# Patient Record
Sex: MALE | Race: WHITE | Employment: OTHER | URBAN - METROPOLITAN AREA
[De-identification: names, ages, dates, MRNs, and addresses within clinical notes are randomized per-mention and may not be internally consistent; named-entity substitution may affect disease eponyms.]

---

## 2017-01-18 ENCOUNTER — TELEPHONE (OUTPATIENT)
Dept: ORTHOPEDIC SURGERY | Age: 67
End: 2017-01-18

## 2017-01-19 ENCOUNTER — OFFICE VISIT (OUTPATIENT)
Dept: INTERNAL MEDICINE CLINIC | Age: 67
End: 2017-01-19

## 2017-01-19 VITALS
WEIGHT: 231 LBS | HEART RATE: 60 BPM | DIASTOLIC BLOOD PRESSURE: 90 MMHG | BODY MASS INDEX: 28.13 KG/M2 | HEIGHT: 76 IN | SYSTOLIC BLOOD PRESSURE: 138 MMHG

## 2017-01-19 DIAGNOSIS — I10 ESSENTIAL HYPERTENSION: ICD-10-CM

## 2017-01-19 DIAGNOSIS — M75.112 INCOMPLETE TEAR OF LEFT ROTATOR CUFF: Primary | ICD-10-CM

## 2017-01-19 DIAGNOSIS — Z01.818 PRE-OPERATIVE GENERAL PHYSICAL EXAMINATION: ICD-10-CM

## 2017-01-19 PROCEDURE — G8420 CALC BMI NORM PARAMETERS: HCPCS | Performed by: INTERNAL MEDICINE

## 2017-01-19 PROCEDURE — 99214 OFFICE O/P EST MOD 30 MIN: CPT | Performed by: INTERNAL MEDICINE

## 2017-01-19 PROCEDURE — 93010 ELECTROCARDIOGRAM REPORT: CPT | Performed by: INTERNAL MEDICINE

## 2017-01-19 PROCEDURE — 3017F COLORECTAL CA SCREEN DOC REV: CPT | Performed by: INTERNAL MEDICINE

## 2017-01-19 PROCEDURE — G8427 DOCREV CUR MEDS BY ELIG CLIN: HCPCS | Performed by: INTERNAL MEDICINE

## 2017-01-19 PROCEDURE — G8484 FLU IMMUNIZE NO ADMIN: HCPCS | Performed by: INTERNAL MEDICINE

## 2017-01-19 PROCEDURE — 1123F ACP DISCUSS/DSCN MKR DOCD: CPT | Performed by: INTERNAL MEDICINE

## 2017-01-19 PROCEDURE — 4040F PNEUMOC VAC/ADMIN/RCVD: CPT | Performed by: INTERNAL MEDICINE

## 2017-01-19 PROCEDURE — 1036F TOBACCO NON-USER: CPT | Performed by: INTERNAL MEDICINE

## 2017-01-19 PROCEDURE — 93005 ELECTROCARDIOGRAM TRACING: CPT | Performed by: INTERNAL MEDICINE

## 2017-01-20 LAB
ANION GAP SERPL CALCULATED.3IONS-SCNC: 12 MMOL/L (ref 3–16)
BASOPHILS ABSOLUTE: 0 K/UL (ref 0–0.2)
BASOPHILS RELATIVE PERCENT: 0.9 %
BUN BLDV-MCNC: 18 MG/DL (ref 7–20)
CALCIUM SERPL-MCNC: 9.7 MG/DL (ref 8.3–10.6)
CHLORIDE BLD-SCNC: 98 MMOL/L (ref 99–110)
CO2: 30 MMOL/L (ref 21–32)
CREAT SERPL-MCNC: 1.2 MG/DL (ref 0.8–1.3)
EOSINOPHILS ABSOLUTE: 0.2 K/UL (ref 0–0.6)
EOSINOPHILS RELATIVE PERCENT: 4.4 %
GFR AFRICAN AMERICAN: >60
GFR NON-AFRICAN AMERICAN: >60
GLUCOSE BLD-MCNC: 87 MG/DL (ref 70–99)
HCT VFR BLD CALC: 45.2 % (ref 40.5–52.5)
HEMOGLOBIN: 15.3 G/DL (ref 13.5–17.5)
LYMPHOCYTES ABSOLUTE: 2.3 K/UL (ref 1–5.1)
LYMPHOCYTES RELATIVE PERCENT: 46.4 %
MCH RBC QN AUTO: 31.8 PG (ref 26–34)
MCHC RBC AUTO-ENTMCNC: 33.8 G/DL (ref 31–36)
MCV RBC AUTO: 94 FL (ref 80–100)
MONOCYTES ABSOLUTE: 0.6 K/UL (ref 0–1.3)
MONOCYTES RELATIVE PERCENT: 11.1 %
NEUTROPHILS ABSOLUTE: 1.8 K/UL (ref 1.7–7.7)
NEUTROPHILS RELATIVE PERCENT: 37.2 %
PDW BLD-RTO: 13.3 % (ref 12.4–15.4)
PLATELET # BLD: 170 K/UL (ref 135–450)
PMV BLD AUTO: 8.3 FL (ref 5–10.5)
POTASSIUM SERPL-SCNC: 4.1 MMOL/L (ref 3.5–5.1)
RBC # BLD: 4.81 M/UL (ref 4.2–5.9)
SODIUM BLD-SCNC: 140 MMOL/L (ref 136–145)
WBC # BLD: 5 K/UL (ref 4–11)

## 2017-01-24 ENCOUNTER — HOSPITAL ENCOUNTER (OUTPATIENT)
Dept: SURGERY | Age: 67
Discharge: HOME | End: 2017-01-24
Attending: ORTHOPAEDIC SURGERY | Admitting: ORTHOPAEDIC SURGERY

## 2017-01-24 VITALS
HEART RATE: 76 BPM | BODY MASS INDEX: 28.13 KG/M2 | WEIGHT: 231 LBS | OXYGEN SATURATION: 92 % | SYSTOLIC BLOOD PRESSURE: 129 MMHG | TEMPERATURE: 97.2 F | HEIGHT: 76 IN | RESPIRATION RATE: 18 BRPM | DIASTOLIC BLOOD PRESSURE: 75 MMHG

## 2017-01-24 DIAGNOSIS — M75.122 COMPLETE ROTATOR CUFF TEAR OR RUPTURE OF LEFT SHOULDER, NOT SPECIFIED AS TRAUMATIC: Primary | ICD-10-CM

## 2017-01-24 RX ORDER — LIDOCAINE HYDROCHLORIDE 10 MG/ML
1 INJECTION, SOLUTION EPIDURAL; INFILTRATION; INTRACAUDAL; PERINEURAL
Status: COMPLETED | OUTPATIENT
Start: 2017-01-24 | End: 2017-01-24

## 2017-01-24 RX ORDER — ONDANSETRON 2 MG/ML
4 INJECTION INTRAMUSCULAR; INTRAVENOUS
Status: ACTIVE | OUTPATIENT
Start: 2017-01-24 | End: 2017-01-24

## 2017-01-24 RX ORDER — MORPHINE SULFATE 2 MG/ML
1 INJECTION, SOLUTION INTRAMUSCULAR; INTRAVENOUS EVERY 5 MIN PRN
Status: DISCONTINUED | OUTPATIENT
Start: 2017-01-24 | End: 2017-01-25 | Stop reason: HOSPADM

## 2017-01-24 RX ORDER — SODIUM CHLORIDE, SODIUM LACTATE, POTASSIUM CHLORIDE, CALCIUM CHLORIDE 600; 310; 30; 20 MG/100ML; MG/100ML; MG/100ML; MG/100ML
INJECTION, SOLUTION INTRAVENOUS CONTINUOUS
Status: DISCONTINUED | OUTPATIENT
Start: 2017-01-24 | End: 2017-01-25 | Stop reason: HOSPADM

## 2017-01-24 RX ORDER — LABETALOL HYDROCHLORIDE 5 MG/ML
5 INJECTION, SOLUTION INTRAVENOUS EVERY 10 MIN PRN
Status: DISCONTINUED | OUTPATIENT
Start: 2017-01-24 | End: 2017-01-25 | Stop reason: HOSPADM

## 2017-01-24 RX ORDER — SODIUM CHLORIDE 0.9 % (FLUSH) 0.9 %
10 SYRINGE (ML) INJECTION EVERY 12 HOURS SCHEDULED
Status: DISCONTINUED | OUTPATIENT
Start: 2017-01-24 | End: 2017-01-25 | Stop reason: HOSPADM

## 2017-01-24 RX ORDER — MORPHINE SULFATE 10 MG/ML
2 INJECTION, SOLUTION INTRAMUSCULAR; INTRAVENOUS EVERY 5 MIN PRN
Status: DISCONTINUED | OUTPATIENT
Start: 2017-01-24 | End: 2017-01-25 | Stop reason: HOSPADM

## 2017-01-24 RX ORDER — ACETAMINOPHEN 10 MG/ML
1000 INJECTION, SOLUTION INTRAVENOUS ONCE
Status: COMPLETED | OUTPATIENT
Start: 2017-01-24 | End: 2017-01-24

## 2017-01-24 RX ORDER — DIPHENHYDRAMINE HYDROCHLORIDE 50 MG/ML
12.5 INJECTION INTRAMUSCULAR; INTRAVENOUS
Status: ACTIVE | OUTPATIENT
Start: 2017-01-24 | End: 2017-01-24

## 2017-01-24 RX ORDER — MEPERIDINE HYDROCHLORIDE 50 MG/ML
12.5 INJECTION INTRAMUSCULAR; INTRAVENOUS; SUBCUTANEOUS EVERY 5 MIN PRN
Status: DISCONTINUED | OUTPATIENT
Start: 2017-01-24 | End: 2017-01-25 | Stop reason: HOSPADM

## 2017-01-24 RX ORDER — OXYCODONE HYDROCHLORIDE AND ACETAMINOPHEN 5; 325 MG/1; MG/1
2 TABLET ORAL PRN
Status: ACTIVE | OUTPATIENT
Start: 2017-01-24 | End: 2017-01-24

## 2017-01-24 RX ORDER — HYDRALAZINE HYDROCHLORIDE 20 MG/ML
5 INJECTION INTRAMUSCULAR; INTRAVENOUS
Status: DISCONTINUED | OUTPATIENT
Start: 2017-01-24 | End: 2017-01-25 | Stop reason: HOSPADM

## 2017-01-24 RX ORDER — OXYCODONE HYDROCHLORIDE AND ACETAMINOPHEN 5; 325 MG/1; MG/1
1 TABLET ORAL PRN
Status: ACTIVE | OUTPATIENT
Start: 2017-01-24 | End: 2017-01-24

## 2017-01-24 RX ORDER — SODIUM CHLORIDE 0.9 % (FLUSH) 0.9 %
10 SYRINGE (ML) INJECTION PRN
Status: DISCONTINUED | OUTPATIENT
Start: 2017-01-24 | End: 2017-01-25 | Stop reason: HOSPADM

## 2017-01-24 RX ORDER — MIDAZOLAM HYDROCHLORIDE 1 MG/ML
INJECTION INTRAMUSCULAR; INTRAVENOUS
Status: DISPENSED
Start: 2017-01-24 | End: 2017-01-24

## 2017-01-24 RX ORDER — OXYCODONE HYDROCHLORIDE AND ACETAMINOPHEN 5; 325 MG/1; MG/1
1-2 TABLET ORAL EVERY 6 HOURS PRN
Qty: 50 TABLET | Refills: 0 | Status: SHIPPED | OUTPATIENT
Start: 2017-01-24 | End: 2018-04-11

## 2017-01-24 RX ADMIN — LIDOCAINE HYDROCHLORIDE 0.1 ML: 10 INJECTION, SOLUTION EPIDURAL; INFILTRATION; INTRACAUDAL; PERINEURAL at 06:37

## 2017-01-24 RX ADMIN — SODIUM CHLORIDE, SODIUM LACTATE, POTASSIUM CHLORIDE, CALCIUM CHLORIDE: 600; 310; 30; 20 INJECTION, SOLUTION INTRAVENOUS at 06:38

## 2017-01-24 RX ADMIN — ACETAMINOPHEN 1000 MG: 10 INJECTION, SOLUTION INTRAVENOUS at 06:35

## 2017-01-24 ASSESSMENT — PAIN - FUNCTIONAL ASSESSMENT: PAIN_FUNCTIONAL_ASSESSMENT: 0-10

## 2017-01-30 ENCOUNTER — HOSPITAL ENCOUNTER (OUTPATIENT)
Dept: PHYSICAL THERAPY | Age: 67
Discharge: HOME | End: 2017-01-31
Admitting: ORTHOPAEDIC SURGERY

## 2017-02-06 ENCOUNTER — OFFICE VISIT (OUTPATIENT)
Dept: ORTHOPEDIC SURGERY | Age: 67
End: 2017-02-06

## 2017-02-06 VITALS
HEIGHT: 75 IN | HEART RATE: 81 BPM | SYSTOLIC BLOOD PRESSURE: 130 MMHG | WEIGHT: 231 LBS | BODY MASS INDEX: 28.72 KG/M2 | DIASTOLIC BLOOD PRESSURE: 76 MMHG

## 2017-02-06 DIAGNOSIS — M75.122 COMPLETE ROTATOR CUFF TEAR OR RUPTURE OF LEFT SHOULDER, NOT SPECIFIED AS TRAUMATIC: ICD-10-CM

## 2017-02-06 DIAGNOSIS — M25.512 LEFT SHOULDER PAIN, UNSPECIFIED CHRONICITY: Primary | ICD-10-CM

## 2017-02-06 PROCEDURE — 73030 X-RAY EXAM OF SHOULDER: CPT | Performed by: ORTHOPAEDIC SURGERY

## 2017-02-06 PROCEDURE — 99024 POSTOP FOLLOW-UP VISIT: CPT | Performed by: ORTHOPAEDIC SURGERY

## 2017-03-13 ENCOUNTER — OFFICE VISIT (OUTPATIENT)
Dept: ORTHOPEDIC SURGERY | Age: 67
End: 2017-03-13

## 2017-03-13 VITALS
WEIGHT: 231 LBS | SYSTOLIC BLOOD PRESSURE: 130 MMHG | BODY MASS INDEX: 29.65 KG/M2 | DIASTOLIC BLOOD PRESSURE: 77 MMHG | HEART RATE: 84 BPM | HEIGHT: 74 IN

## 2017-03-13 DIAGNOSIS — M75.122 COMPLETE ROTATOR CUFF TEAR OR RUPTURE OF LEFT SHOULDER, NOT SPECIFIED AS TRAUMATIC: Primary | ICD-10-CM

## 2017-03-13 PROCEDURE — 99024 POSTOP FOLLOW-UP VISIT: CPT | Performed by: ORTHOPAEDIC SURGERY

## 2017-03-29 ENCOUNTER — OFFICE VISIT (OUTPATIENT)
Dept: INTERNAL MEDICINE CLINIC | Age: 67
End: 2017-03-29

## 2017-03-29 VITALS
SYSTOLIC BLOOD PRESSURE: 134 MMHG | WEIGHT: 231 LBS | HEIGHT: 76 IN | DIASTOLIC BLOOD PRESSURE: 78 MMHG | BODY MASS INDEX: 28.13 KG/M2 | HEART RATE: 72 BPM

## 2017-03-29 DIAGNOSIS — I10 ESSENTIAL HYPERTENSION: Primary | ICD-10-CM

## 2017-03-29 DIAGNOSIS — Z23 NEED FOR 23-POLYVALENT PNEUMOCOCCAL POLYSACCHARIDE VACCINE: ICD-10-CM

## 2017-03-29 PROCEDURE — 99213 OFFICE O/P EST LOW 20 MIN: CPT | Performed by: INTERNAL MEDICINE

## 2017-03-29 PROCEDURE — G8420 CALC BMI NORM PARAMETERS: HCPCS | Performed by: INTERNAL MEDICINE

## 2017-03-29 PROCEDURE — 1036F TOBACCO NON-USER: CPT | Performed by: INTERNAL MEDICINE

## 2017-03-29 PROCEDURE — G0009 ADMIN PNEUMOCOCCAL VACCINE: HCPCS | Performed by: INTERNAL MEDICINE

## 2017-03-29 PROCEDURE — G8427 DOCREV CUR MEDS BY ELIG CLIN: HCPCS | Performed by: INTERNAL MEDICINE

## 2017-03-29 PROCEDURE — G8484 FLU IMMUNIZE NO ADMIN: HCPCS | Performed by: INTERNAL MEDICINE

## 2017-03-29 PROCEDURE — 1123F ACP DISCUSS/DSCN MKR DOCD: CPT | Performed by: INTERNAL MEDICINE

## 2017-03-29 PROCEDURE — 3017F COLORECTAL CA SCREEN DOC REV: CPT | Performed by: INTERNAL MEDICINE

## 2017-03-29 PROCEDURE — 90732 PPSV23 VACC 2 YRS+ SUBQ/IM: CPT | Performed by: INTERNAL MEDICINE

## 2017-03-29 PROCEDURE — 4040F PNEUMOC VAC/ADMIN/RCVD: CPT | Performed by: INTERNAL MEDICINE

## 2017-03-29 RX ORDER — LISINOPRIL AND HYDROCHLOROTHIAZIDE 25; 20 MG/1; MG/1
TABLET ORAL
Qty: 90 TABLET | Refills: 1 | Status: SHIPPED | OUTPATIENT
Start: 2017-03-29 | End: 2017-10-27 | Stop reason: SDUPTHER

## 2017-03-29 ASSESSMENT — PATIENT HEALTH QUESTIONNAIRE - PHQ9
SUM OF ALL RESPONSES TO PHQ9 QUESTIONS 1 & 2: 0
SUM OF ALL RESPONSES TO PHQ QUESTIONS 1-9: 0
1. LITTLE INTEREST OR PLEASURE IN DOING THINGS: 0
2. FEELING DOWN, DEPRESSED OR HOPELESS: 0

## 2017-03-29 ASSESSMENT — ENCOUNTER SYMPTOMS
SHORTNESS OF BREATH: 0
DIARRHEA: 0
BLOOD IN STOOL: 0
WHEEZING: 0
VOMITING: 0
CHEST TIGHTNESS: 0
COUGH: 0
ABDOMINAL PAIN: 0
NAUSEA: 0

## 2017-04-03 ENCOUNTER — HOSPITAL ENCOUNTER (OUTPATIENT)
Dept: PHYSICAL THERAPY | Age: 67
Discharge: HOME | End: 2017-04-03
Admitting: ORTHOPAEDIC SURGERY

## 2017-04-10 ENCOUNTER — OFFICE VISIT (OUTPATIENT)
Dept: ORTHOPEDIC SURGERY | Age: 67
End: 2017-04-10

## 2017-04-10 VITALS — WEIGHT: 231 LBS | BODY MASS INDEX: 30.62 KG/M2 | HEIGHT: 73 IN

## 2017-04-10 DIAGNOSIS — M75.122 COMPLETE ROTATOR CUFF TEAR OR RUPTURE OF LEFT SHOULDER, NOT SPECIFIED AS TRAUMATIC: Primary | ICD-10-CM

## 2017-04-10 PROCEDURE — 99024 POSTOP FOLLOW-UP VISIT: CPT | Performed by: ORTHOPAEDIC SURGERY

## 2017-07-31 ENCOUNTER — OFFICE VISIT (OUTPATIENT)
Dept: INTERNAL MEDICINE CLINIC | Age: 67
End: 2017-07-31

## 2017-07-31 VITALS
HEIGHT: 76 IN | SYSTOLIC BLOOD PRESSURE: 132 MMHG | TEMPERATURE: 97.7 F | DIASTOLIC BLOOD PRESSURE: 80 MMHG | WEIGHT: 223 LBS | HEART RATE: 68 BPM | BODY MASS INDEX: 27.16 KG/M2

## 2017-07-31 DIAGNOSIS — J01.10 ACUTE NON-RECURRENT FRONTAL SINUSITIS: Primary | ICD-10-CM

## 2017-07-31 PROCEDURE — G8427 DOCREV CUR MEDS BY ELIG CLIN: HCPCS | Performed by: INTERNAL MEDICINE

## 2017-07-31 PROCEDURE — 1036F TOBACCO NON-USER: CPT | Performed by: INTERNAL MEDICINE

## 2017-07-31 PROCEDURE — 4040F PNEUMOC VAC/ADMIN/RCVD: CPT | Performed by: INTERNAL MEDICINE

## 2017-07-31 PROCEDURE — 3017F COLORECTAL CA SCREEN DOC REV: CPT | Performed by: INTERNAL MEDICINE

## 2017-07-31 PROCEDURE — 1123F ACP DISCUSS/DSCN MKR DOCD: CPT | Performed by: INTERNAL MEDICINE

## 2017-07-31 PROCEDURE — 99213 OFFICE O/P EST LOW 20 MIN: CPT | Performed by: INTERNAL MEDICINE

## 2017-07-31 PROCEDURE — G8417 CALC BMI ABV UP PARAM F/U: HCPCS | Performed by: INTERNAL MEDICINE

## 2017-07-31 RX ORDER — CEFDINIR 300 MG/1
300 CAPSULE ORAL 2 TIMES DAILY
Qty: 20 CAPSULE | Refills: 0 | Status: SHIPPED | OUTPATIENT
Start: 2017-07-31 | End: 2017-08-10

## 2017-07-31 ASSESSMENT — ENCOUNTER SYMPTOMS
SORE THROAT: 0
SINUS PAIN: 1
RHINORRHEA: 1
COUGH: 0
NAUSEA: 0
DIARRHEA: 0
SINUS PRESSURE: 1
WHEEZING: 0

## 2017-10-27 ENCOUNTER — OFFICE VISIT (OUTPATIENT)
Dept: INTERNAL MEDICINE CLINIC | Age: 67
End: 2017-10-27

## 2017-10-27 VITALS
BODY MASS INDEX: 26.91 KG/M2 | DIASTOLIC BLOOD PRESSURE: 80 MMHG | WEIGHT: 221 LBS | HEART RATE: 72 BPM | HEIGHT: 76 IN | SYSTOLIC BLOOD PRESSURE: 122 MMHG

## 2017-10-27 DIAGNOSIS — Z23 NEED FOR INFLUENZA VACCINATION: ICD-10-CM

## 2017-10-27 DIAGNOSIS — F17.219 NICOTINE DEPENDENCE, CIGARETTES, WITH UNSPECIFIED NICOTINE-INDUCED DISORDERS: ICD-10-CM

## 2017-10-27 DIAGNOSIS — I10 ESSENTIAL HYPERTENSION: Primary | ICD-10-CM

## 2017-10-27 DIAGNOSIS — Z13.6 SCREENING FOR AAA (ABDOMINAL AORTIC ANEURYSM): ICD-10-CM

## 2017-10-27 PROCEDURE — G8427 DOCREV CUR MEDS BY ELIG CLIN: HCPCS | Performed by: INTERNAL MEDICINE

## 2017-10-27 PROCEDURE — 4040F PNEUMOC VAC/ADMIN/RCVD: CPT | Performed by: INTERNAL MEDICINE

## 2017-10-27 PROCEDURE — 3017F COLORECTAL CA SCREEN DOC REV: CPT | Performed by: INTERNAL MEDICINE

## 2017-10-27 PROCEDURE — 90662 IIV NO PRSV INCREASED AG IM: CPT | Performed by: INTERNAL MEDICINE

## 2017-10-27 PROCEDURE — 1036F TOBACCO NON-USER: CPT | Performed by: INTERNAL MEDICINE

## 2017-10-27 PROCEDURE — G8484 FLU IMMUNIZE NO ADMIN: HCPCS | Performed by: INTERNAL MEDICINE

## 2017-10-27 PROCEDURE — 99213 OFFICE O/P EST LOW 20 MIN: CPT | Performed by: INTERNAL MEDICINE

## 2017-10-27 PROCEDURE — G8417 CALC BMI ABV UP PARAM F/U: HCPCS | Performed by: INTERNAL MEDICINE

## 2017-10-27 PROCEDURE — G0296 VISIT TO DETERM LDCT ELIG: HCPCS | Performed by: INTERNAL MEDICINE

## 2017-10-27 PROCEDURE — G0008 ADMIN INFLUENZA VIRUS VAC: HCPCS | Performed by: INTERNAL MEDICINE

## 2017-10-27 PROCEDURE — 1123F ACP DISCUSS/DSCN MKR DOCD: CPT | Performed by: INTERNAL MEDICINE

## 2017-10-27 RX ORDER — LISINOPRIL AND HYDROCHLOROTHIAZIDE 25; 20 MG/1; MG/1
TABLET ORAL
Qty: 90 TABLET | Refills: 1 | Status: SHIPPED | OUTPATIENT
Start: 2017-10-27 | End: 2018-04-11 | Stop reason: SDUPTHER

## 2017-10-27 ASSESSMENT — ENCOUNTER SYMPTOMS
DIARRHEA: 0
BLOOD IN STOOL: 0
CHEST TIGHTNESS: 0
SHORTNESS OF BREATH: 0
WHEEZING: 0
NAUSEA: 0
COUGH: 0
ABDOMINAL PAIN: 0
VOMITING: 0

## 2017-10-27 NOTE — PROGRESS NOTES
Subjective:      Patient ID: Ariana Tse is a 75 y.o. male. HPI    Is here for follow up of HTN and mild CKD  Patient's BP is controlled on current medications. Retired a couple of years ago, but works occasionally. No complaints. Review of Systems   Constitutional: Negative. Negative for fatigue and fever. Respiratory: Negative for cough, chest tightness, shortness of breath and wheezing. Cardiovascular: Negative for chest pain and palpitations. Gastrointestinal: Negative for abdominal pain, blood in stool, diarrhea, nausea and vomiting. Objective:   Physical Exam   Constitutional: He is oriented to person, place, and time. He appears well-developed and well-nourished. HENT:   Head: Normocephalic and atraumatic. Eyes: Pupils are equal, round, and reactive to light. Neck: Normal range of motion. Neck supple. No thyromegaly present. Cardiovascular: Normal rate, regular rhythm and normal heart sounds. Exam reveals no gallop and no friction rub. No murmur heard. No carotid bruit   Pulmonary/Chest: Effort normal and breath sounds normal. No respiratory distress. He has no wheezes. He has no rales. He exhibits no tenderness. Abdominal: Soft. Bowel sounds are normal. He exhibits no distension and no mass. There is no tenderness. There is no rebound and no guarding. Musculoskeletal: He exhibits no edema. Neurological: He is alert and oriented to person, place, and time. Assessment:      1. Essential hypertension     2. Need for influenza vaccination  INFLUENZA, HIGH DOSE, 65 YRS +, IM, PF, PREFILL SYR, 0.5ML (FLUZONE HD)   3. Screening for AAA (abdominal aortic aneurysm)  US Abdominal Aorta   4. Nicotine dependence, cigarettes, with unspecified nicotine-induced disorders  AR VISIT TO DISCUSS LUNG CA SCREEN W LDCT    CT Lung Screening           Plan:        Blood pressure is good. Continue current medications.      Up to date on colonoscopy.           Low Dose CT (LDCT) Lung Screening criteria met   Age 50-69   Pack year smoking >30   Still smoking or less than 15 year since quit   No sign or symptoms of lung cancer   > 11 months since last LDCT     Risks and benefits of lung cancer screening with LDCT scans discussed:    Significance of positive screen - False-positive LDCT results often occur. 95% of all positive results do not lead to a diagnosis of cancer. Usually further imaging can resolve most false-positive results; however, some patients may require invasive procedures. Over diagnosis risk - 10% to 12% of screen-detected lung cancer cases are over diagnosedthat is, the cancer would not have been detected in the patient's lifetime without the screening. Need for follow up screens annually to continue lung cancer screening effectiveness     Risks associated with radiation from annual LDCT- Radiation exposure is about the same as for a mammogram, which is about 1/3 of the annual background radiation exposure from everyday life. Starting screening at age 54 is not likely to increase cancer risk from radiation exposure. Patients with comorbidities resulting in life expectancy of < 10 years, or that would preclude treatment of an abnormality identified on CT, should not be screened due to lack of benefit.     To obtain maximal benefit from this screening, smoking cessation and long-term abstinence from smoking is critical

## 2017-11-14 ENCOUNTER — TELEPHONE (OUTPATIENT)
Dept: CASE MANAGEMENT | Age: 67
End: 2017-11-14

## 2017-11-14 ENCOUNTER — HOSPITAL ENCOUNTER (OUTPATIENT)
Dept: CT IMAGING | Age: 67
Discharge: HOME | End: 2017-11-14
Admitting: INTERNAL MEDICINE

## 2017-11-14 DIAGNOSIS — Z13.6 SCREENING FOR AAA (ABDOMINAL AORTIC ANEURYSM): ICD-10-CM

## 2017-11-14 DIAGNOSIS — Z13.6 ENCOUNTER FOR SCREENING FOR CARDIOVASCULAR DISORDERS: ICD-10-CM

## 2017-11-14 DIAGNOSIS — F17.219 NICOTINE DEPENDENCE, CIGARETTES, WITH UNSPECIFIED NICOTINE-INDUCED DISORDERS: ICD-10-CM

## 2017-11-14 NOTE — TELEPHONE ENCOUNTER
Baseline lung screen on 11-14-17. LRAD1. Recommended screen in one year. Reviewed by ordering physician and ordering office has contacted pt with results.

## 2018-04-11 ENCOUNTER — OFFICE VISIT (OUTPATIENT)
Dept: INTERNAL MEDICINE CLINIC | Age: 68
End: 2018-04-11

## 2018-04-11 VITALS
WEIGHT: 219 LBS | HEART RATE: 76 BPM | BODY MASS INDEX: 26.67 KG/M2 | HEIGHT: 76 IN | SYSTOLIC BLOOD PRESSURE: 136 MMHG | DIASTOLIC BLOOD PRESSURE: 84 MMHG

## 2018-04-11 DIAGNOSIS — I10 ESSENTIAL HYPERTENSION: Primary | ICD-10-CM

## 2018-04-11 PROCEDURE — G8428 CUR MEDS NOT DOCUMENT: HCPCS | Performed by: INTERNAL MEDICINE

## 2018-04-11 PROCEDURE — 99213 OFFICE O/P EST LOW 20 MIN: CPT | Performed by: INTERNAL MEDICINE

## 2018-04-11 PROCEDURE — 3017F COLORECTAL CA SCREEN DOC REV: CPT | Performed by: INTERNAL MEDICINE

## 2018-04-11 PROCEDURE — 1123F ACP DISCUSS/DSCN MKR DOCD: CPT | Performed by: INTERNAL MEDICINE

## 2018-04-11 PROCEDURE — G8417 CALC BMI ABV UP PARAM F/U: HCPCS | Performed by: INTERNAL MEDICINE

## 2018-04-11 PROCEDURE — 1036F TOBACCO NON-USER: CPT | Performed by: INTERNAL MEDICINE

## 2018-04-11 PROCEDURE — 4040F PNEUMOC VAC/ADMIN/RCVD: CPT | Performed by: INTERNAL MEDICINE

## 2018-04-11 RX ORDER — LISINOPRIL AND HYDROCHLOROTHIAZIDE 25; 20 MG/1; MG/1
TABLET ORAL
Qty: 90 TABLET | Refills: 1 | Status: SHIPPED | OUTPATIENT
Start: 2018-04-11 | End: 2018-10-17 | Stop reason: SDUPTHER

## 2018-04-11 ASSESSMENT — ENCOUNTER SYMPTOMS
SHORTNESS OF BREATH: 0
WHEEZING: 0
CHEST TIGHTNESS: 0
BLOOD IN STOOL: 0
NAUSEA: 0
COUGH: 0
ABDOMINAL PAIN: 0
VOMITING: 0
DIARRHEA: 0

## 2018-10-24 ENCOUNTER — OFFICE VISIT (OUTPATIENT)
Dept: INTERNAL MEDICINE CLINIC | Age: 68
End: 2018-10-24

## 2018-10-24 VITALS
HEIGHT: 76 IN | DIASTOLIC BLOOD PRESSURE: 80 MMHG | WEIGHT: 226 LBS | SYSTOLIC BLOOD PRESSURE: 138 MMHG | BODY MASS INDEX: 27.52 KG/M2 | HEART RATE: 76 BPM

## 2018-10-24 DIAGNOSIS — I10 ESSENTIAL HYPERTENSION: Primary | ICD-10-CM

## 2018-10-24 DIAGNOSIS — Z23 NEED FOR INFLUENZA VACCINATION: ICD-10-CM

## 2018-10-24 DIAGNOSIS — I10 ESSENTIAL HYPERTENSION: ICD-10-CM

## 2018-10-24 DIAGNOSIS — B35.3 TINEA PEDIS OF LEFT FOOT: ICD-10-CM

## 2018-10-24 LAB
ANION GAP SERPL CALCULATED.3IONS-SCNC: 13 MMOL/L (ref 3–16)
BASOPHILS ABSOLUTE: 0 K/UL (ref 0–0.2)
BASOPHILS RELATIVE PERCENT: 0.8 %
BUN BLDV-MCNC: 20 MG/DL (ref 7–20)
CALCIUM SERPL-MCNC: 9.4 MG/DL (ref 8.3–10.6)
CHLORIDE BLD-SCNC: 101 MMOL/L (ref 99–110)
CO2: 27 MMOL/L (ref 21–32)
CREAT SERPL-MCNC: 1.4 MG/DL (ref 0.8–1.3)
EOSINOPHILS ABSOLUTE: 0.3 K/UL (ref 0–0.6)
EOSINOPHILS RELATIVE PERCENT: 5.1 %
GFR AFRICAN AMERICAN: >60
GFR NON-AFRICAN AMERICAN: 50
GLUCOSE BLD-MCNC: 99 MG/DL (ref 70–99)
HCT VFR BLD CALC: 43.5 % (ref 40.5–52.5)
HEMOGLOBIN: 14.9 G/DL (ref 13.5–17.5)
LYMPHOCYTES ABSOLUTE: 2.1 K/UL (ref 1–5.1)
LYMPHOCYTES RELATIVE PERCENT: 38.6 %
MCH RBC QN AUTO: 32.8 PG (ref 26–34)
MCHC RBC AUTO-ENTMCNC: 34.2 G/DL (ref 31–36)
MCV RBC AUTO: 95.9 FL (ref 80–100)
MONOCYTES ABSOLUTE: 0.4 K/UL (ref 0–1.3)
MONOCYTES RELATIVE PERCENT: 7.2 %
NEUTROPHILS ABSOLUTE: 2.7 K/UL (ref 1.7–7.7)
NEUTROPHILS RELATIVE PERCENT: 48.3 %
PDW BLD-RTO: 13.2 % (ref 12.4–15.4)
PLATELET # BLD: 178 K/UL (ref 135–450)
PMV BLD AUTO: 8.3 FL (ref 5–10.5)
POTASSIUM SERPL-SCNC: 3.8 MMOL/L (ref 3.5–5.1)
RBC # BLD: 4.54 M/UL (ref 4.2–5.9)
SODIUM BLD-SCNC: 141 MMOL/L (ref 136–145)
WBC # BLD: 5.5 K/UL (ref 4–11)

## 2018-10-24 PROCEDURE — G0008 ADMIN INFLUENZA VIRUS VAC: HCPCS | Performed by: INTERNAL MEDICINE

## 2018-10-24 PROCEDURE — 1123F ACP DISCUSS/DSCN MKR DOCD: CPT | Performed by: INTERNAL MEDICINE

## 2018-10-24 PROCEDURE — G8482 FLU IMMUNIZE ORDER/ADMIN: HCPCS | Performed by: INTERNAL MEDICINE

## 2018-10-24 PROCEDURE — 99213 OFFICE O/P EST LOW 20 MIN: CPT | Performed by: INTERNAL MEDICINE

## 2018-10-24 PROCEDURE — 1036F TOBACCO NON-USER: CPT | Performed by: INTERNAL MEDICINE

## 2018-10-24 PROCEDURE — G8427 DOCREV CUR MEDS BY ELIG CLIN: HCPCS | Performed by: INTERNAL MEDICINE

## 2018-10-24 PROCEDURE — 90662 IIV NO PRSV INCREASED AG IM: CPT | Performed by: INTERNAL MEDICINE

## 2018-10-24 PROCEDURE — 4040F PNEUMOC VAC/ADMIN/RCVD: CPT | Performed by: INTERNAL MEDICINE

## 2018-10-24 PROCEDURE — 3017F COLORECTAL CA SCREEN DOC REV: CPT | Performed by: INTERNAL MEDICINE

## 2018-10-24 PROCEDURE — G8417 CALC BMI ABV UP PARAM F/U: HCPCS | Performed by: INTERNAL MEDICINE

## 2018-10-24 PROCEDURE — 1101F PT FALLS ASSESS-DOCD LE1/YR: CPT | Performed by: INTERNAL MEDICINE

## 2018-10-24 RX ORDER — TERBINAFINE HYDROCHLORIDE 250 MG/1
250 TABLET ORAL DAILY
Qty: 14 TABLET | Refills: 0 | Status: SHIPPED | OUTPATIENT
Start: 2018-10-24 | End: 2018-11-07

## 2018-10-24 RX ORDER — LISINOPRIL AND HYDROCHLOROTHIAZIDE 25; 20 MG/1; MG/1
TABLET ORAL
Qty: 30 TABLET | Refills: 2 | Status: SHIPPED | OUTPATIENT
Start: 2018-10-24 | End: 2019-01-25 | Stop reason: SDUPTHER

## 2018-10-24 ASSESSMENT — ENCOUNTER SYMPTOMS
VOMITING: 0
DIARRHEA: 0
ABDOMINAL PAIN: 0
WHEEZING: 0
NAUSEA: 0
COUGH: 0
CHEST TIGHTNESS: 0
SHORTNESS OF BREATH: 0
BLOOD IN STOOL: 0

## 2018-10-24 ASSESSMENT — PATIENT HEALTH QUESTIONNAIRE - PHQ9
SUM OF ALL RESPONSES TO PHQ QUESTIONS 1-9: 0
1. LITTLE INTEREST OR PLEASURE IN DOING THINGS: 0
SUM OF ALL RESPONSES TO PHQ QUESTIONS 1-9: 0
2. FEELING DOWN, DEPRESSED OR HOPELESS: 0
SUM OF ALL RESPONSES TO PHQ9 QUESTIONS 1 & 2: 0

## 2018-11-16 ENCOUNTER — TELEPHONE (OUTPATIENT)
Dept: CASE MANAGEMENT | Age: 68
End: 2018-11-16

## 2019-01-25 ENCOUNTER — OFFICE VISIT (OUTPATIENT)
Dept: INTERNAL MEDICINE CLINIC | Age: 69
End: 2019-01-25

## 2019-01-25 VITALS
DIASTOLIC BLOOD PRESSURE: 80 MMHG | WEIGHT: 226 LBS | HEART RATE: 76 BPM | SYSTOLIC BLOOD PRESSURE: 152 MMHG | BODY MASS INDEX: 27.52 KG/M2 | HEIGHT: 76 IN

## 2019-01-25 DIAGNOSIS — Z00.00 ROUTINE GENERAL MEDICAL EXAMINATION AT A HEALTH CARE FACILITY: Primary | ICD-10-CM

## 2019-01-25 DIAGNOSIS — I10 ESSENTIAL HYPERTENSION: ICD-10-CM

## 2019-01-25 PROCEDURE — G0438 PPPS, INITIAL VISIT: HCPCS | Performed by: INTERNAL MEDICINE

## 2019-01-25 PROCEDURE — G8482 FLU IMMUNIZE ORDER/ADMIN: HCPCS | Performed by: INTERNAL MEDICINE

## 2019-01-25 PROCEDURE — 4040F PNEUMOC VAC/ADMIN/RCVD: CPT | Performed by: INTERNAL MEDICINE

## 2019-01-25 RX ORDER — LISINOPRIL AND HYDROCHLOROTHIAZIDE 25; 20 MG/1; MG/1
TABLET ORAL
Qty: 90 TABLET | Refills: 1 | Status: SHIPPED | OUTPATIENT
Start: 2019-01-25 | End: 2019-08-26 | Stop reason: SDUPTHER

## 2019-01-25 RX ORDER — SILDENAFIL CITRATE 20 MG/1
20 TABLET ORAL DAILY PRN
Qty: 20 TABLET | Refills: 2 | Status: SHIPPED | OUTPATIENT
Start: 2019-01-25 | End: 2019-12-15 | Stop reason: SDUPTHER

## 2019-01-25 ASSESSMENT — LIFESTYLE VARIABLES
HOW MANY STANDARD DRINKS CONTAINING ALCOHOL DO YOU HAVE ON A TYPICAL DAY: 0
HOW OFTEN DO YOU HAVE SIX OR MORE DRINKS ON ONE OCCASION: 0
HOW OFTEN DO YOU HAVE A DRINK CONTAINING ALCOHOL: 4
AUDIT-C TOTAL SCORE: 4

## 2019-01-25 ASSESSMENT — PATIENT HEALTH QUESTIONNAIRE - PHQ9
SUM OF ALL RESPONSES TO PHQ QUESTIONS 1-9: 0
SUM OF ALL RESPONSES TO PHQ QUESTIONS 1-9: 0

## 2019-01-25 ASSESSMENT — ANXIETY QUESTIONNAIRES: GAD7 TOTAL SCORE: 0

## 2019-01-30 ENCOUNTER — TELEPHONE (OUTPATIENT)
Dept: INTERNAL MEDICINE CLINIC | Age: 69
End: 2019-01-30

## 2019-01-31 ENCOUNTER — TELEPHONE (OUTPATIENT)
Dept: INTERNAL MEDICINE CLINIC | Age: 69
End: 2019-01-31

## 2019-02-01 ENCOUNTER — TELEPHONE (OUTPATIENT)
Dept: INTERNAL MEDICINE CLINIC | Age: 69
End: 2019-02-01

## 2019-08-26 RX ORDER — LISINOPRIL AND HYDROCHLOROTHIAZIDE 25; 20 MG/1; MG/1
TABLET ORAL
Qty: 90 TABLET | Refills: 0 | Status: SHIPPED | OUTPATIENT
Start: 2019-08-26 | End: 2019-09-26 | Stop reason: SDUPTHER

## 2019-09-26 ENCOUNTER — OFFICE VISIT (OUTPATIENT)
Dept: INTERNAL MEDICINE CLINIC | Age: 69
End: 2019-09-26

## 2019-09-26 VITALS
HEIGHT: 76 IN | DIASTOLIC BLOOD PRESSURE: 82 MMHG | HEART RATE: 68 BPM | BODY MASS INDEX: 26.42 KG/M2 | SYSTOLIC BLOOD PRESSURE: 130 MMHG | WEIGHT: 217 LBS

## 2019-09-26 DIAGNOSIS — N18.2 STAGE 2 CHRONIC KIDNEY DISEASE: ICD-10-CM

## 2019-09-26 DIAGNOSIS — Z87.891 PERSONAL HISTORY OF TOBACCO USE: ICD-10-CM

## 2019-09-26 DIAGNOSIS — Z23 NEED FOR INFLUENZA VACCINATION: ICD-10-CM

## 2019-09-26 DIAGNOSIS — I10 ESSENTIAL HYPERTENSION: Primary | ICD-10-CM

## 2019-09-26 DIAGNOSIS — H66.90 ACUTE OTITIS MEDIA, UNSPECIFIED OTITIS MEDIA TYPE: ICD-10-CM

## 2019-09-26 DIAGNOSIS — I10 ESSENTIAL HYPERTENSION: ICD-10-CM

## 2019-09-26 LAB
BASOPHILS ABSOLUTE: 0 K/UL (ref 0–0.2)
BASOPHILS RELATIVE PERCENT: 0.6 %
EOSINOPHILS ABSOLUTE: 0.3 K/UL (ref 0–0.6)
EOSINOPHILS RELATIVE PERCENT: 5.1 %
HCT VFR BLD CALC: 45.2 % (ref 40.5–52.5)
HEMOGLOBIN: 15.6 G/DL (ref 13.5–17.5)
LYMPHOCYTES ABSOLUTE: 2.4 K/UL (ref 1–5.1)
LYMPHOCYTES RELATIVE PERCENT: 40.9 %
MCH RBC QN AUTO: 33.4 PG (ref 26–34)
MCHC RBC AUTO-ENTMCNC: 34.5 G/DL (ref 31–36)
MCV RBC AUTO: 96.7 FL (ref 80–100)
MONOCYTES ABSOLUTE: 0.4 K/UL (ref 0–1.3)
MONOCYTES RELATIVE PERCENT: 7.2 %
NEUTROPHILS ABSOLUTE: 2.7 K/UL (ref 1.7–7.7)
NEUTROPHILS RELATIVE PERCENT: 46.2 %
PDW BLD-RTO: 13.4 % (ref 12.4–15.4)
PLATELET # BLD: 169 K/UL (ref 135–450)
PMV BLD AUTO: 8.4 FL (ref 5–10.5)
RBC # BLD: 4.68 M/UL (ref 4.2–5.9)
WBC # BLD: 5.9 K/UL (ref 4–11)

## 2019-09-26 PROCEDURE — G0008 ADMIN INFLUENZA VIRUS VAC: HCPCS | Performed by: INTERNAL MEDICINE

## 2019-09-26 PROCEDURE — G8427 DOCREV CUR MEDS BY ELIG CLIN: HCPCS | Performed by: INTERNAL MEDICINE

## 2019-09-26 PROCEDURE — G0296 VISIT TO DETERM LDCT ELIG: HCPCS | Performed by: INTERNAL MEDICINE

## 2019-09-26 PROCEDURE — 4040F PNEUMOC VAC/ADMIN/RCVD: CPT | Performed by: INTERNAL MEDICINE

## 2019-09-26 PROCEDURE — 90662 IIV NO PRSV INCREASED AG IM: CPT | Performed by: INTERNAL MEDICINE

## 2019-09-26 PROCEDURE — G8417 CALC BMI ABV UP PARAM F/U: HCPCS | Performed by: INTERNAL MEDICINE

## 2019-09-26 PROCEDURE — 99214 OFFICE O/P EST MOD 30 MIN: CPT | Performed by: INTERNAL MEDICINE

## 2019-09-26 PROCEDURE — 3017F COLORECTAL CA SCREEN DOC REV: CPT | Performed by: INTERNAL MEDICINE

## 2019-09-26 PROCEDURE — 4004F PT TOBACCO SCREEN RCVD TLK: CPT | Performed by: INTERNAL MEDICINE

## 2019-09-26 PROCEDURE — 1123F ACP DISCUSS/DSCN MKR DOCD: CPT | Performed by: INTERNAL MEDICINE

## 2019-09-26 RX ORDER — CEFDINIR 300 MG/1
300 CAPSULE ORAL 2 TIMES DAILY
Qty: 10 CAPSULE | Refills: 0 | Status: SHIPPED | OUTPATIENT
Start: 2019-09-26 | End: 2019-10-01

## 2019-09-26 RX ORDER — LISINOPRIL AND HYDROCHLOROTHIAZIDE 25; 20 MG/1; MG/1
TABLET ORAL
Qty: 90 TABLET | Refills: 1 | Status: SHIPPED | OUTPATIENT
Start: 2019-09-26 | End: 2020-05-27

## 2019-09-26 ASSESSMENT — ENCOUNTER SYMPTOMS
BLOOD IN STOOL: 0
SHORTNESS OF BREATH: 0
WHEEZING: 0
COUGH: 0
NAUSEA: 0
ABDOMINAL PAIN: 0
VOMITING: 0
CHEST TIGHTNESS: 0
DIARRHEA: 0

## 2019-09-27 LAB
ANION GAP SERPL CALCULATED.3IONS-SCNC: 15 MMOL/L (ref 3–16)
BUN BLDV-MCNC: 18 MG/DL (ref 7–20)
CALCIUM SERPL-MCNC: 9.6 MG/DL (ref 8.3–10.6)
CHLORIDE BLD-SCNC: 97 MMOL/L (ref 99–110)
CO2: 24 MMOL/L (ref 21–32)
CREAT SERPL-MCNC: 1.3 MG/DL (ref 0.8–1.3)
GFR AFRICAN AMERICAN: >60
GFR NON-AFRICAN AMERICAN: 55
GLUCOSE BLD-MCNC: 101 MG/DL (ref 70–99)
POTASSIUM SERPL-SCNC: 3.7 MMOL/L (ref 3.5–5.1)
SODIUM BLD-SCNC: 136 MMOL/L (ref 136–145)

## 2019-12-16 RX ORDER — SILDENAFIL CITRATE 20 MG/1
TABLET ORAL
Qty: 20 TABLET | Refills: 0 | Status: SHIPPED | OUTPATIENT
Start: 2019-12-16 | End: 2020-05-27 | Stop reason: SDUPTHER

## 2020-05-27 RX ORDER — SILDENAFIL CITRATE 20 MG/1
TABLET ORAL
Qty: 20 TABLET | Refills: 0 | Status: SHIPPED | OUTPATIENT
Start: 2020-05-27 | End: 2020-07-07

## 2020-05-27 RX ORDER — LISINOPRIL AND HYDROCHLOROTHIAZIDE 25; 20 MG/1; MG/1
TABLET ORAL
Qty: 90 TABLET | Refills: 0 | Status: SHIPPED | OUTPATIENT
Start: 2020-05-27 | End: 2020-08-12 | Stop reason: SDUPTHER

## 2020-05-27 NOTE — TELEPHONE ENCOUNTER
----- Message from Yissel Fraga sent at 5/27/2020 10:38 AM EDT -----  Contact: [phone]  Pt called requesting a refill on his viagra.       [address] [facility]    Pharmacy: [facility] [address], [address] [facility] Raheem Wisdom [phone]  \  Future appt- 8/12/2020      SE

## 2020-06-18 RX ORDER — TERBINAFINE HYDROCHLORIDE 250 MG/1
250 TABLET ORAL DAILY
Qty: 14 TABLET | Refills: 0 | Status: SHIPPED | OUTPATIENT
Start: 2020-06-18 | End: 2020-07-02

## 2020-06-18 NOTE — TELEPHONE ENCOUNTER
----- Message from Lulu Cloud MD sent at 6/18/2020 11:17 AM EDT -----  Contact: pt- [phone]  Lamisil 250 mg daily - 2 weeks   ----- Message -----  From: Andrew Parkinson Shay  Sent: 6/18/2020  10:08 AM EDT  To: Lulu Cloud MD    He wanted to know what you would recommend for his athletes foot- said he has tried some OTC lamisil ?  But it did not help - he has it on his left foot only - last appt- 1-72-31-next appt- 8-46-41-[place] in [place] at [phone]-tj

## 2020-07-07 RX ORDER — SILDENAFIL CITRATE 20 MG/1
TABLET ORAL
Qty: 20 TABLET | Refills: 0 | Status: SHIPPED | OUTPATIENT
Start: 2020-07-07 | End: 2020-08-12 | Stop reason: SDUPTHER

## 2020-07-10 ENCOUNTER — OFFICE VISIT (OUTPATIENT)
Dept: INTERNAL MEDICINE CLINIC | Age: 70
End: 2020-07-10

## 2020-07-10 VITALS
BODY MASS INDEX: 26.86 KG/M2 | HEIGHT: 75 IN | SYSTOLIC BLOOD PRESSURE: 138 MMHG | WEIGHT: 216 LBS | DIASTOLIC BLOOD PRESSURE: 88 MMHG | HEART RATE: 80 BPM

## 2020-07-10 PROCEDURE — 99213 OFFICE O/P EST LOW 20 MIN: CPT | Performed by: INTERNAL MEDICINE

## 2020-07-10 PROCEDURE — 4004F PT TOBACCO SCREEN RCVD TLK: CPT | Performed by: INTERNAL MEDICINE

## 2020-07-10 PROCEDURE — G8417 CALC BMI ABV UP PARAM F/U: HCPCS | Performed by: INTERNAL MEDICINE

## 2020-07-10 PROCEDURE — 3017F COLORECTAL CA SCREEN DOC REV: CPT | Performed by: INTERNAL MEDICINE

## 2020-07-10 PROCEDURE — 4040F PNEUMOC VAC/ADMIN/RCVD: CPT | Performed by: INTERNAL MEDICINE

## 2020-07-10 PROCEDURE — G8428 CUR MEDS NOT DOCUMENT: HCPCS | Performed by: INTERNAL MEDICINE

## 2020-07-10 PROCEDURE — 1123F ACP DISCUSS/DSCN MKR DOCD: CPT | Performed by: INTERNAL MEDICINE

## 2020-07-10 ASSESSMENT — ENCOUNTER SYMPTOMS
VOMITING: 0
BLOOD IN STOOL: 0
WHEEZING: 0
SHORTNESS OF BREATH: 0
COUGH: 0
DIARRHEA: 0
ABDOMINAL PAIN: 0
NAUSEA: 0
CHEST TIGHTNESS: 0

## 2020-07-10 NOTE — PROGRESS NOTES
Subjective:      Patient ID: Geeta Ferrari is a 78 y.o. male. HPI  C/o rash between left foot 3 and 4 th toe  Has had athlete's feet for years. but it flares up periodically   Recently treated with lamisil- 2 weeks- did not help    Review of Systems   Constitutional: Negative. Negative for fatigue and fever. Respiratory: Negative for cough, chest tightness, shortness of breath and wheezing. Cardiovascular: Negative for chest pain and palpitations. Gastrointestinal: Negative for abdominal pain, blood in stool, diarrhea, nausea and vomiting. Objective:   Physical Exam  Constitutional:       Appearance: He is well-developed. HENT:      Head: Normocephalic and atraumatic. Eyes:      Pupils: Pupils are equal, round, and reactive to light. Neck:      Musculoskeletal: Normal range of motion and neck supple. Thyroid: No thyromegaly. Cardiovascular:      Rate and Rhythm: Normal rate and regular rhythm. Heart sounds: Normal heart sounds. No murmur. No friction rub. No gallop. Comments: No carotid bruit  Pulmonary:      Effort: Pulmonary effort is normal. No respiratory distress. Breath sounds: Normal breath sounds. No wheezing or rales. Chest:      Chest wall: No tenderness. Skin:     Comments: Left foot - web between 3 and 4 the toe- macerated skin with redness    Neurological:      Mental Status: He is alert and oriented to person, place, and time. Assessment:       Diagnosis Orders   1. Tinea pedis of left foot             Plan:      Ciclopirox cream bid  Keep quaze between toes.   See derm if not better         Anabella Sampson MD

## 2020-07-23 ENCOUNTER — TELEPHONE (OUTPATIENT)
Dept: INTERNAL MEDICINE CLINIC | Age: 70
End: 2020-07-23

## 2020-08-12 ENCOUNTER — OFFICE VISIT (OUTPATIENT)
Dept: INTERNAL MEDICINE CLINIC | Age: 70
End: 2020-08-12

## 2020-08-12 VITALS
SYSTOLIC BLOOD PRESSURE: 122 MMHG | WEIGHT: 218 LBS | DIASTOLIC BLOOD PRESSURE: 80 MMHG | HEIGHT: 75 IN | BODY MASS INDEX: 27.1 KG/M2 | HEART RATE: 68 BPM

## 2020-08-12 DIAGNOSIS — I10 ESSENTIAL HYPERTENSION: ICD-10-CM

## 2020-08-12 DIAGNOSIS — Z00.00 ROUTINE GENERAL MEDICAL EXAMINATION AT A HEALTH CARE FACILITY: ICD-10-CM

## 2020-08-12 LAB
A/G RATIO: 1.7 (ref 1.1–2.2)
ALBUMIN SERPL-MCNC: 4.6 G/DL (ref 3.4–5)
ALP BLD-CCNC: 49 U/L (ref 40–129)
ALT SERPL-CCNC: 17 U/L (ref 10–40)
ANION GAP SERPL CALCULATED.3IONS-SCNC: 11 MMOL/L (ref 3–16)
AST SERPL-CCNC: 19 U/L (ref 15–37)
BASOPHILS ABSOLUTE: 0 K/UL (ref 0–0.2)
BASOPHILS RELATIVE PERCENT: 0.7 %
BILIRUB SERPL-MCNC: 0.6 MG/DL (ref 0–1)
BUN BLDV-MCNC: 18 MG/DL (ref 7–20)
CALCIUM SERPL-MCNC: 9.2 MG/DL (ref 8.3–10.6)
CHLORIDE BLD-SCNC: 98 MMOL/L (ref 99–110)
CHOLESTEROL, TOTAL: 184 MG/DL (ref 0–199)
CO2: 27 MMOL/L (ref 21–32)
CREAT SERPL-MCNC: 1.1 MG/DL (ref 0.8–1.3)
EOSINOPHILS ABSOLUTE: 0.2 K/UL (ref 0–0.6)
EOSINOPHILS RELATIVE PERCENT: 4.1 %
GFR AFRICAN AMERICAN: >60
GFR NON-AFRICAN AMERICAN: >60
GLOBULIN: 2.7 G/DL
GLUCOSE BLD-MCNC: 87 MG/DL (ref 70–99)
HCT VFR BLD CALC: 44.7 % (ref 40.5–52.5)
HDLC SERPL-MCNC: 46 MG/DL (ref 40–60)
HEMOGLOBIN: 15.2 G/DL (ref 13.5–17.5)
LDL CHOLESTEROL CALCULATED: 104 MG/DL
LYMPHOCYTES ABSOLUTE: 2 K/UL (ref 1–5.1)
LYMPHOCYTES RELATIVE PERCENT: 45.6 %
MCH RBC QN AUTO: 32.6 PG (ref 26–34)
MCHC RBC AUTO-ENTMCNC: 34.1 G/DL (ref 31–36)
MCV RBC AUTO: 95.7 FL (ref 80–100)
MONOCYTES ABSOLUTE: 0.3 K/UL (ref 0–1.3)
MONOCYTES RELATIVE PERCENT: 7.1 %
NEUTROPHILS ABSOLUTE: 1.9 K/UL (ref 1.7–7.7)
NEUTROPHILS RELATIVE PERCENT: 42.5 %
PDW BLD-RTO: 13.8 % (ref 12.4–15.4)
PLATELET # BLD: 169 K/UL (ref 135–450)
PMV BLD AUTO: 8.4 FL (ref 5–10.5)
POTASSIUM SERPL-SCNC: 3.9 MMOL/L (ref 3.5–5.1)
RBC # BLD: 4.67 M/UL (ref 4.2–5.9)
SODIUM BLD-SCNC: 136 MMOL/L (ref 136–145)
TOTAL PROTEIN: 7.3 G/DL (ref 6.4–8.2)
TRIGL SERPL-MCNC: 168 MG/DL (ref 0–150)
VLDLC SERPL CALC-MCNC: 34 MG/DL
WBC # BLD: 4.4 K/UL (ref 4–11)

## 2020-08-12 PROCEDURE — 1123F ACP DISCUSS/DSCN MKR DOCD: CPT | Performed by: INTERNAL MEDICINE

## 2020-08-12 PROCEDURE — G0439 PPPS, SUBSEQ VISIT: HCPCS | Performed by: INTERNAL MEDICINE

## 2020-08-12 PROCEDURE — 4040F PNEUMOC VAC/ADMIN/RCVD: CPT | Performed by: INTERNAL MEDICINE

## 2020-08-12 PROCEDURE — 3017F COLORECTAL CA SCREEN DOC REV: CPT | Performed by: INTERNAL MEDICINE

## 2020-08-12 RX ORDER — SILDENAFIL CITRATE 20 MG/1
TABLET ORAL
Qty: 20 TABLET | Refills: 1 | Status: SHIPPED | OUTPATIENT
Start: 2020-08-12 | End: 2020-12-11

## 2020-08-12 RX ORDER — LISINOPRIL AND HYDROCHLOROTHIAZIDE 25; 20 MG/1; MG/1
TABLET ORAL
Qty: 90 TABLET | Refills: 1 | Status: SHIPPED | OUTPATIENT
Start: 2020-08-12 | End: 2021-02-19

## 2020-08-12 ASSESSMENT — PATIENT HEALTH QUESTIONNAIRE - PHQ9
SUM OF ALL RESPONSES TO PHQ QUESTIONS 1-9: 0
SUM OF ALL RESPONSES TO PHQ QUESTIONS 1-9: 0

## 2020-08-12 ASSESSMENT — LIFESTYLE VARIABLES
HOW OFTEN DO YOU HAVE A DRINK CONTAINING ALCOHOL: 4
AUDIT-C TOTAL SCORE: 4
HOW OFTEN DO YOU HAVE SIX OR MORE DRINKS ON ONE OCCASION: 0
HOW MANY STANDARD DRINKS CONTAINING ALCOHOL DO YOU HAVE ON A TYPICAL DAY: 0

## 2020-08-12 NOTE — PROGRESS NOTES
Medicare Annual Wellness Visit  Name: Kennedi Right Date: 2020   MRN: [id number] Sex: Male   Age: 69 y.o. Ethnicity: Non-/Non    : 1950 Race: Beth Dorado is here for Medicare AWV    Screenings for behavioral, psychosocial and functional/safety risks, and cognitive dysfunction are all negative except as indicated below. These results, as well as other patient data from the [address] form, are documented in Flowsheets linked to this Encounter. No Known Allergies    Prior to Visit Medications    Medication Sig Taking? Authorizing Provider   ciclopirox (LOPROX) 0.77 % cream Apply topically 2 times daily. Ramon Agrawal MD   sildenafil (REVATIO) 20 MG tablet TAKE ONE TABLET BY MOUTH DAILY AS NEEDED FOR ERECTILE DYSFUNCTION  Ramon Agrawal MD   lisinopril-hydroCHLOROthiazide (PRINZIDE;ZESTORETIC) 20-25 MG per tablet Jed Horn MD       Past Medical History:   Diagnosis Date    Complete rupture of rotator cuff 07    Impotence of organic origin 07    PONV (postoperative nausea and vomiting)     Unspecified essential hypertension 07, 08       Past Surgical History:   Procedure Laterality Date    SHOULDER ARTHROSCOPY Left 2017     Rotator cuff repair subacromial decompression partial acromioplasty and biceps tenodesis    SHOULDER SURGERY Right     WISDOM TOOTH EXTRACTION         History reviewed. No pertinent family history.     CareTeam (Including outside providers/suppliers regularly involved in providing care):   Patient Care Team:  Ramon Agrawal MD as PCP - General (Internal Medicine)  Ramon Agrawal MD as PCP - [facility] Empaneled Provider  Wally Rodriguez MD (Orthopedic Surgery)  [place], [place] as Physician Assistant (Orthopedic Surgery)  Harpreet Allen RN as Registered Nurse    Wt Readings from Last 3 Encounters: 08/12/20 218 lb (98.9 kg)   07/10/20 216 lb (98 kg)   09/26/19 217 lb (98.4 kg)     Vitals:    08/12/20 0935   BP: 122/80   Pulse: 68   Weight: 218 lb (98.9 kg)   Height: 6' 3\" (1.905 m)     Body mass index is 27.25 kg/m². Based upon direct observation of the patient, evaluation of cognition reveals recent and remote memory intact. General Appearance: alert and oriented to person, place and time, well developed and well- nourished, in no acute distress  Skin: warm and dry, no rash or erythema  Head: normocephalic and atraumatic  Eyes: pupils equal, round, and reactive to light, extraocular eye movements intact, conjunctivae normal  Neck: supple and non-tender without mass, no thyromegaly or thyroid nodules, no cervical lymphadenopathy  Pulmonary/Chest: clear to auscultation bilaterally- no wheezes, rales or rhonchi, normal air movement, no respiratory distress  Cardiovascular: normal rate, regular rhythm, normal S1 and S2, no murmurs, rubs, clicks, or gallops, distal pulses intact, no carotid bruits  Abdomen: soft, non-tender, non-distended, normal bowel sounds, no masses or organomegaly  Extremities: no cyanosis, clubbing or edema      Patient's complete Health Risk Assessment and screening values have been reviewed and are found in Flowsheets. The following problems were reviewed today and where indicated follow up appointments were made and/or referrals ordered.     Positive Risk Factor Screenings with Interventions:     Substance Abuse:  Social History     Tobacco History     Smoking Status  Current Every Day Smoker Last attempt to quit  2/19/2014 Smoking Frequency  1 pack/day for 42 years (42 pk yrs)    Smokeless Tobacco Use  Never Used          Alcohol History     Alcohol Use Status  Yes Drinks/Week  14 Cans of beer per week Amount  14.0 standard drinks of alcohol/wk          Drug Use     Drug Use Status  No          Sexual Activity     Sexually Active  Yes               Audit Questionnaire: Screen for Alcohol Misuse  How often do you have a drink containing alcohol?: Four or more times a week  How many standard drinks containing alcohol do you have on a typical day when drinking?: One or two  How often do you have six or more drinks on one occasion?: Never  Audit-C Score: 4  Substance Abuse Interventions:  · Tobacco abuse:  tobacco cessation tips and resources provided    General Health:  General  In general, how would you say your health is?: Very Good  In the past 7 days, have you experienced any of the following?  New or Increased Pain, New or Increased Fatigue, Loneliness, Social Isolation, Stress or Anger?: None of These  Do you get the social and emotional support that you need?: Yes  Do you have a Living Will?: (!) No  General Health Risk Interventions:  · No Living Will: info provided     Safety:  Safety  Do you have working smoke detectors?: Yes  Have all throw rugs been removed or fastened?: (!) No  Do you have non-slip mats or surfaces in all bathtubs/showers?: (!) No  Do all of your stairways have a railing or banister?: Yes  Are your doorways, halls and stairs free of clutter?: Yes  Do you always fasten your seatbelt when you are in a car?: Yes  Safety Interventions:  · Home safety tips provided    Personalized Preventive Plan   Current Health Maintenance Status  Immunization History   Administered Date(s) Administered    Influenza, High Dose (Fluzone 65 yrs and older) 09/30/2016, 10/27/2017, 10/24/2018, 09/26/2019    Pneumococcal Conjugate 13-valent ([id number]) 01/07/2016    Pneumococcal Polysaccharide ([id number]) 03/29/2017    Tetanus 06/19/2014        Health Maintenance   Topic Date Due    Hepatitis C screen  1950    DTaP/Tdap/Td vaccine (1 - Tdap) 07/24/1968    Shingles Vaccine (1 of 2) 07/24/1999    Low dose CT lung screening  11/14/2018    Annual Wellness Visit (AWV)  05/29/2019    Flu vaccine (1) 09/01/2020    Potassium monitoring  09/26/2020    Creatinine monitoring 09/26/2020    Lipid screen  01/07/2021    Colon cancer screen colonoscopy  02/10/2022    Pneumococcal 65+ years Vaccine  Completed    AAA screen  Completed    Hepatitis A vaccine  Aged Out    Hepatitis B vaccine  Aged Out    Hib vaccine  Aged Out    Meningococcal (ACWY) vaccine  Aged Out     Recommendations for [employer] Due: see orders and patient instructions/AVS.  . Recommended screening schedule for the next 5-10 years is provided to the patient in written form: see Patient Instructions/AVS.    Lenny Curry was seen today for medicare awv. Diagnoses and all orders for this visit:    Routine general medical examination at a health care facility  -     Hemoglobin A1C; Future  -     Comprehensive Metabolic Panel; Future  -     Lipid Panel; Future    Essential hypertension  -     CBC Auto Differential; Future  -     Comprehensive Metabolic Panel; Future    Stage 2 chronic kidney disease    Impotence of organic origin    Other orders  -     sildenafil (REVATIO) 20 MG tablet; TAKE ONE TABLET BY MOUTH DAILY AS NEEDED FOR ERECTILE DYSFUNCTION  -     lisinopril-hydroCHLOROthiazide (PRINZIDE;ZESTORETIC) 20-25 MG per tablet; TAKE ONE TABLET BY MOUTH DAILY            AWV     HTN. Stable.   Continue current medications. Counselled to stop smoking.     Up to date on colonoscopy. Advance Care Planning   Advanced Care Planning: Discussed the patients choices for care and treatment in case of a health event that adversely affects decision-making abilities. Also discussed the patients long-term treatment options. Reviewed with the patient the [address] [place] [address] Declaration forms  Reviewed the process of designating a competent adult as an Agent (or -in-fact) that could take make health care decisions for the patient if incompetent.  Patient was asked to complete the declaration forms, either acknowledge the forms by a public notary or an eligible witness and provide a signed copy to the practice office. Time spent (minutes): 2     LDCT Screening: Discussed with patient the benefits and harms of screening, follow-up diagnostic testing, over-diagnosis, false positive rate, and total radiation exposure. Counseled on the importance of adherence to annual lung cancer LDCT screening, impact of comorbidities, ability and willingness to undergo diagnosis and treatment. Counseled on the importance of maintaining cigarette smoking abstinence and cessation. Patient has a history of heavy tobacco use of over 30 pack years. Patient does not present signs or symptoms of lung cancer.

## 2020-08-13 LAB
ESTIMATED AVERAGE GLUCOSE: 119.8 MG/DL
HBA1C MFR BLD: 5.8 %

## 2020-12-11 RX ORDER — SILDENAFIL CITRATE 20 MG/1
TABLET ORAL
Qty: 20 TABLET | Refills: 0 | Status: SHIPPED | OUTPATIENT
Start: 2020-12-11 | End: 2021-04-01 | Stop reason: SDUPTHER

## 2021-02-19 RX ORDER — LISINOPRIL AND HYDROCHLOROTHIAZIDE 25; 20 MG/1; MG/1
TABLET ORAL
Qty: 30 TABLET | Refills: 0 | Status: SHIPPED | OUTPATIENT
Start: 2021-02-19 | End: 2021-02-23 | Stop reason: SDUPTHER

## 2021-02-23 ENCOUNTER — TELEPHONE (OUTPATIENT)
Dept: INTERNAL MEDICINE CLINIC | Age: 71
End: 2021-02-23

## 2021-02-23 RX ORDER — LISINOPRIL AND HYDROCHLOROTHIAZIDE 25; 20 MG/1; MG/1
TABLET ORAL
Qty: 30 TABLET | Refills: 1 | Status: SHIPPED | OUTPATIENT
Start: 2021-02-23 | End: 2021-04-01 | Stop reason: SDUPTHER

## 2021-02-23 NOTE — TELEPHONE ENCOUNTER
----- Message from Doss Kussmaul sent at 2/23/2021 10:24 AM EST -----  Contact: [phone] (H)  Pt needs his refill for   lisinopril-hydroCHLOROthiazide (PRINZIDE;ZESTORETIC) 20-25 MG per tablet    Sent to [address] [phone]

## 2021-04-01 ENCOUNTER — OFFICE VISIT (OUTPATIENT)
Dept: INTERNAL MEDICINE CLINIC | Age: 71
End: 2021-04-01

## 2021-04-01 VITALS
WEIGHT: 224 LBS | TEMPERATURE: 99 F | SYSTOLIC BLOOD PRESSURE: 132 MMHG | BODY MASS INDEX: 27.85 KG/M2 | HEIGHT: 75 IN | HEART RATE: 72 BPM | DIASTOLIC BLOOD PRESSURE: 88 MMHG

## 2021-04-01 DIAGNOSIS — N52.9 IMPOTENCE OF ORGANIC ORIGIN: ICD-10-CM

## 2021-04-01 DIAGNOSIS — I10 ESSENTIAL HYPERTENSION: Primary | ICD-10-CM

## 2021-04-01 DIAGNOSIS — Z87.891 PERSONAL HISTORY OF TOBACCO USE: ICD-10-CM

## 2021-04-01 PROCEDURE — 1123F ACP DISCUSS/DSCN MKR DOCD: CPT | Performed by: INTERNAL MEDICINE

## 2021-04-01 PROCEDURE — G8417 CALC BMI ABV UP PARAM F/U: HCPCS | Performed by: INTERNAL MEDICINE

## 2021-04-01 PROCEDURE — 4004F PT TOBACCO SCREEN RCVD TLK: CPT | Performed by: INTERNAL MEDICINE

## 2021-04-01 PROCEDURE — 3017F COLORECTAL CA SCREEN DOC REV: CPT | Performed by: INTERNAL MEDICINE

## 2021-04-01 PROCEDURE — G8427 DOCREV CUR MEDS BY ELIG CLIN: HCPCS | Performed by: INTERNAL MEDICINE

## 2021-04-01 PROCEDURE — G0296 VISIT TO DETERM LDCT ELIG: HCPCS | Performed by: INTERNAL MEDICINE

## 2021-04-01 PROCEDURE — 99213 OFFICE O/P EST LOW 20 MIN: CPT | Performed by: INTERNAL MEDICINE

## 2021-04-01 PROCEDURE — 4040F PNEUMOC VAC/ADMIN/RCVD: CPT | Performed by: INTERNAL MEDICINE

## 2021-04-01 RX ORDER — LISINOPRIL AND HYDROCHLOROTHIAZIDE 25; 20 MG/1; MG/1
TABLET ORAL
Qty: 90 TABLET | Refills: 1 | Status: SHIPPED | OUTPATIENT
Start: 2021-04-01 | End: 2021-10-21 | Stop reason: SDUPTHER

## 2021-04-01 RX ORDER — SILDENAFIL CITRATE 20 MG/1
TABLET ORAL
Qty: 20 TABLET | Refills: 5 | Status: SHIPPED | OUTPATIENT
Start: 2021-04-01 | End: 2022-05-18 | Stop reason: SDUPTHER

## 2021-04-01 ASSESSMENT — ENCOUNTER SYMPTOMS
CHEST TIGHTNESS: 0
DIARRHEA: 0
BLOOD IN STOOL: 0
NAUSEA: 0
SHORTNESS OF BREATH: 0
WHEEZING: 0
ABDOMINAL PAIN: 0
VOMITING: 0
COUGH: 0

## 2021-04-01 NOTE — PROGRESS NOTES
Kasi Pierson (:  1950) is a 69 y.o. male,Established patient, here for evaluation of the following chief complaint(s):  Follow-up      ASSESSMENT/PLAN:     Diagnosis Orders   1. Essential hypertension     2. Impotence of organic origin     3. Personal history of tobacco use  AL VISIT TO DISCUSS LUNG CA SCREEN W LDCT    CT Lung Screen (Annual)       HTN. Stable.   Continue current medications.     Counselled to stop smoking.     Up to date on colonoscopy. SUBJECTIVE/OBJECTIVE:  HPI  Is here for follow up of HTN and mild CKD  Patient's BP is controlled on current medications. Review of Systems   Constitutional: Negative. Negative for fatigue and fever. Respiratory: Negative for cough, chest tightness, shortness of breath and wheezing. Cardiovascular: Negative for chest pain and palpitations. Gastrointestinal: Negative for abdominal pain, blood in stool, diarrhea, nausea and vomiting. Physical Exam  Constitutional:       Appearance: He is well-developed. HENT:      Head: Normocephalic and atraumatic. Eyes:      Pupils: Pupils are equal, round, and reactive to light. Neck:      Musculoskeletal: Normal range of motion and neck supple. Thyroid: No thyromegaly. Cardiovascular:      Rate and Rhythm: Normal rate and regular rhythm. Heart sounds: Normal heart sounds. No murmur. No friction rub. No gallop. Comments: No carotid bruit  Pulmonary:      Effort: Pulmonary effort is normal. No respiratory distress. Breath sounds: Normal breath sounds. No wheezing or rales. Chest:      Chest wall: No tenderness. Abdominal:      General: Bowel sounds are normal. There is no distension. Palpations: Abdomen is soft. There is no mass. Tenderness: There is no abdominal tenderness. There is no guarding or rebound. Neurological:      Mental Status: He is alert and oriented to person, place, and time. An electronic signature was used to authenticate this note. --Ethan Marie MD   Low Dose CT (LDCT) Lung Screening criteria met   Age 50-69   Pack year smoking >30   Still smoking or less than 15 year since quit   No sign or symptoms of lung cancer   > 11 months since last LDCT     Risks and benefits of lung cancer screening with LDCT scans discussed:    Significance of positive screen - False-positive LDCT results often occur. 95% of all positive results do not lead to a diagnosis of cancer. Usually further imaging can resolve most false-positive results; however, some patients may require invasive procedures. Over diagnosis risk - 10% to 12% of screen-detected lung cancer cases are over diagnosedthat is, the cancer would not have been detected in the patient's lifetime without the screening. Need for follow up screens annually to continue lung cancer screening effectiveness     Risks associated with radiation from annual LDCT- Radiation exposure is about the same as for a mammogram, which is about 1/3 of the annual background radiation exposure from everyday life. Starting screening at age 54 is not likely to increase cancer risk from radiation exposure. Patients with comorbidities resulting in life expectancy of < 10 years, or that would preclude treatment of an abnormality identified on CT, should not be screened due to lack of benefit.     To obtain maximal benefit from this screening, smoking cessation and long-term abstinence from smoking is critical

## 2021-10-21 RX ORDER — LISINOPRIL AND HYDROCHLOROTHIAZIDE 25; 20 MG/1; MG/1
TABLET ORAL
Qty: 90 TABLET | Refills: 0 | Status: SHIPPED | OUTPATIENT
Start: 2021-10-21 | End: 2022-01-21

## 2021-12-02 ENCOUNTER — HOSPITAL ENCOUNTER (OUTPATIENT)
Dept: CT IMAGING | Age: 71
Discharge: HOME | End: 2021-12-02
Payer: MEDICARE

## 2021-12-02 ENCOUNTER — OFFICE VISIT (OUTPATIENT)
Dept: INTERNAL MEDICINE CLINIC | Age: 71
End: 2021-12-02

## 2021-12-02 VITALS
WEIGHT: 220 LBS | BODY MASS INDEX: 27.35 KG/M2 | DIASTOLIC BLOOD PRESSURE: 88 MMHG | SYSTOLIC BLOOD PRESSURE: 138 MMHG | HEART RATE: 72 BPM | HEIGHT: 75 IN

## 2021-12-02 DIAGNOSIS — Z00.00 ROUTINE GENERAL MEDICAL EXAMINATION AT A HEALTH CARE FACILITY: Primary | ICD-10-CM

## 2021-12-02 DIAGNOSIS — I10 ESSENTIAL HYPERTENSION: ICD-10-CM

## 2021-12-02 DIAGNOSIS — Z87.891 PERSONAL HISTORY OF TOBACCO USE: ICD-10-CM

## 2021-12-02 DIAGNOSIS — N18.2 STAGE 2 CHRONIC KIDNEY DISEASE: ICD-10-CM

## 2021-12-02 DIAGNOSIS — Z00.00 ROUTINE GENERAL MEDICAL EXAMINATION AT A HEALTH CARE FACILITY: ICD-10-CM

## 2021-12-02 LAB
BASOPHILS ABSOLUTE: 0 K/UL (ref 0–0.2)
BASOPHILS RELATIVE PERCENT: 0.7 %
EOSINOPHILS ABSOLUTE: 0.3 K/UL (ref 0–0.6)
EOSINOPHILS RELATIVE PERCENT: 5.1 %
HCT VFR BLD CALC: 45 % (ref 40.5–52.5)
HEMOGLOBIN: 14.8 G/DL (ref 13.5–17.5)
LYMPHOCYTES ABSOLUTE: 2.3 K/UL (ref 1–5.1)
LYMPHOCYTES RELATIVE PERCENT: 39.3 %
MCH RBC QN AUTO: 32.2 PG (ref 26–34)
MCHC RBC AUTO-ENTMCNC: 32.8 G/DL (ref 31–36)
MCV RBC AUTO: 98 FL (ref 80–100)
MONOCYTES ABSOLUTE: 0.4 K/UL (ref 0–1.3)
MONOCYTES RELATIVE PERCENT: 7 %
NEUTROPHILS ABSOLUTE: 2.8 K/UL (ref 1.7–7.7)
NEUTROPHILS RELATIVE PERCENT: 47.9 %
PDW BLD-RTO: 14 % (ref 12.4–15.4)
PLATELET # BLD: 153 K/UL (ref 135–450)
PMV BLD AUTO: 8.7 FL (ref 5–10.5)
RBC # BLD: 4.6 M/UL (ref 4.2–5.9)
WBC # BLD: 5.8 K/UL (ref 4–11)

## 2021-12-02 PROCEDURE — 3017F COLORECTAL CA SCREEN DOC REV: CPT | Performed by: INTERNAL MEDICINE

## 2021-12-02 PROCEDURE — 4040F PNEUMOC VAC/ADMIN/RCVD: CPT | Performed by: INTERNAL MEDICINE

## 2021-12-02 PROCEDURE — G8484 FLU IMMUNIZE NO ADMIN: HCPCS | Performed by: INTERNAL MEDICINE

## 2021-12-02 PROCEDURE — 1123F ACP DISCUSS/DSCN MKR DOCD: CPT | Performed by: INTERNAL MEDICINE

## 2021-12-02 PROCEDURE — 71271 CT THORAX LUNG CANCER SCR C-: CPT

## 2021-12-02 PROCEDURE — G0439 PPPS, SUBSEQ VISIT: HCPCS | Performed by: INTERNAL MEDICINE

## 2021-12-02 ASSESSMENT — PATIENT HEALTH QUESTIONNAIRE - PHQ9
2. FEELING DOWN, DEPRESSED OR HOPELESS: 0
SUM OF ALL RESPONSES TO PHQ QUESTIONS 1-9: 0
1. LITTLE INTEREST OR PLEASURE IN DOING THINGS: 0
SUM OF ALL RESPONSES TO PHQ9 QUESTIONS 1 & 2: 0
SUM OF ALL RESPONSES TO PHQ QUESTIONS 1-9: 0
SUM OF ALL RESPONSES TO PHQ QUESTIONS 1-9: 0

## 2021-12-02 ASSESSMENT — LIFESTYLE VARIABLES
HOW MANY STANDARD DRINKS CONTAINING ALCOHOL DO YOU HAVE ON A TYPICAL DAY: 0
HOW OFTEN DO YOU HAVE A DRINK CONTAINING ALCOHOL: 3
AUDIT-C TOTAL SCORE: 3
HOW OFTEN DO YOU HAVE SIX OR MORE DRINKS ON ONE OCCASION: 0

## 2021-12-02 NOTE — PATIENT INSTRUCTIONS
Personalized Preventive Plan for Juan Ramon Peñaloza - 12/2/2021  Medicare offers a range of preventive health benefits. Some of the tests and screenings are paid in full while other may be subject to a deductible, co-insurance, and/or copay. Some of these benefits include a comprehensive review of your medical history including lifestyle, illnesses that may run in your family, and various assessments and screenings as appropriate. After reviewing your medical record and screening and assessments performed today your provider may have ordered immunizations, labs, imaging, and/or referrals for you. A list of these orders (if applicable) as well as your Preventive Care list are included within your After Visit Summary for your review. Other Preventive Recommendations:    · A preventive eye exam performed by an eye specialist is recommended every 1-2 years to screen for glaucoma; cataracts, macular degeneration, and other eye disorders. · A preventive dental visit is recommended every 6 months. · Try to get at least 150 minutes of exercise per week or 10,000 steps per day on a pedometer . · Order or download the FREE \"Exercise & Physical Activity: Your Everyday Guide\" from [employer]. Call 6-795.930.6096 or search [employer] online. · You need 3322-8972 mg of calcium and 2707-5469 IU of vitamin D per day. It is possible to meet your calcium requirement with diet alone, but a vitamin D supplement is usually necessary to meet this goal.  · When exposed to the sun, use a sunscreen that protects against both UVA and UVB radiation with an SPF of 30 or greater. Reapply every 2 to 3 hours or after sweating, drying off with a towel, or swimming. · Always wear a seat belt when traveling in a car. Always wear a helmet when riding a bicycle or motorcycle.

## 2021-12-02 NOTE — PROGRESS NOTES
Medicare Annual Wellness Visit  Name: Verenice Dash Date: 2021   MRN: [id number] Sex: Male   Age: 66 y.o. Ethnicity: Non- / Non    : 1950 Race: White (non-)      Hari Meneses is here for Medicare AWV    Screenings for behavioral, psychosocial and functional/safety risks, and cognitive dysfunction are all negative except as indicated below. These results, as well as other patient data from the [address] form, are documented in Flowsheets linked to this Encounter. No Known Allergies    Prior to Visit Medications    Medication Sig Taking? Authorizing Provider   lisinopril-hydroCHLOROthiazide (PRINZIDE;ZESTORETIC) 20-25 MG per tablet TAKE ONE TABLET BY MOUTH DAILY  Omi Frances MD   sildenafil (REVATIO) 20 MG tablet TAKE ONE TABLET BY MOUTH DAILY AS NEEDED FOR ERECTILE DYSFUNCTION  Omi Frances MD   ciclopirox (LOPROX) 0.77 % cream Apply topically 2 times daily. Omi Frances MD       Past Medical History:   Diagnosis Date    Complete rupture of rotator cuff 07    Impotence of organic origin 07    PONV (postoperative nausea and vomiting)     Unspecified essential hypertension 07, 08       Past Surgical History:   Procedure Laterality Date    SHOULDER ARTHROSCOPY Left 2017     Rotator cuff repair subacromial decompression partial acromioplasty and biceps tenodesis    SHOULDER SURGERY Right     WISDOM TOOTH EXTRACTION         History reviewed. No pertinent family history.     CareTeam (Including outside providers/suppliers regularly involved in providing care):   Patient Care Team:  Omi Frances MD as PCP - General (Internal Medicine)  Omi Frances MD as PCP - [facility] Empaneled Provider  Axel Harmon MD (Orthopedic Surgery)  Km Sullivan as Physician Assistant (Orthopedic Surgery)  Stalin Pollard RN as Registered Nurse    Wt Readings from Last 3 Encounters:   12/02/21 220 lb (99.8 kg)   04/01/21 224 lb (101.6 kg)   08/12/20 218 lb (98.9 kg)     Vitals:    12/02/21 1326   BP: 138/88   Pulse: 72   Weight: 220 lb (99.8 kg)   Height: 6' 3\" (1.905 m)     Body mass index is 27.5 kg/m². Based upon direct observation of the patient, evaluation of cognition reveals recent and remote memory intact. General Appearance: alert and oriented to person, place and time, well developed and well- nourished, in no acute distress  Skin: warm and dry, no rash or erythema  Head: normocephalic and atraumatic  Eyes: pupils equal, round, and reactive to light, extraocular eye movements intact, conjunctivae normal  ENT: tympanic membrane, external ear and ear canal normal bilaterally, nose without deformity, nasal mucosa and turbinates normal without polyps  Neck: supple and non-tender without mass, no thyromegaly or thyroid nodules, no cervical lymphadenopathy  Pulmonary/Chest: clear to auscultation bilaterally- no wheezes, rales or rhonchi, normal air movement, no respiratory distress  Cardiovascular: normal rate, regular rhythm, normal S1 and S2, no murmurs, rubs, clicks, or gallops, distal pulses intact, no carotid bruits  Abdomen: soft, non-tender, non-distended, normal bowel sounds, no masses or organomegaly  Extremities: no cyanosis, clubbing or edema      Patient's complete Health Risk Assessment and screening values have been reviewed and are found in Flowsheets. The following problems were reviewed today and where indicated follow up appointments were made and/or referrals ordered.     Positive Risk Factor Screenings with Interventions:         Substance History:  Social History     Tobacco History     Smoking Status  Current Every Day Smoker Last attempt to quit  2/19/2014 Smoking Frequency  1 pack/day for 42 years (42 pk yrs)    Smokeless Tobacco Use  Never Used          Alcohol History     Alcohol Use Status  Yes Drinks/Week  14 Cans of beer per week Amount  14.0 standard drinks of alcohol/wk          Drug Use     Drug Use Status  No          Sexual Activity     Sexually Active  Yes               Alcohol Screening: Audit-C Score: 3    A score of 8 or more is associated with harmful or hazardous drinking. A score of 13 or more in women, and 15 or more in men, is likely to indicate alcohol dependence. Substance Abuse Interventions:  · Tobacco abuse:  advised t ostop smoking     General Health and ACP:  General  In general, how would you say your health is?: Good  In the past 7 days, have you experienced any of the following?  New or Increased Pain, New or Increased Fatigue, Loneliness, Social Isolation, Stress or Anger?: None of These  Do you get the social and emotional support that you need?: Yes  Do you have a Living Will?: (!) No  Advance Directives     Power of [address] [place] [address] Will ACP-Advance Directive ACP-Power of     Not on File Not on File Filed Not on File      General Health Risk Interventions:  · No Living Will: Advance Care Planning addressed with patient today    Health Habits/Nutrition:  Health Habits/Nutrition  Do you exercise for at least 20 minutes 2-3 times per week?: Yes  Have you lost any weight without trying in the past 3 months?: No  Do you eat only one meal per day?: No  Have you seen the dentist within the past year?: (!) No  Body mass index: (!) 27.5  Health Habits/Nutrition Interventions:  · Dental exam overdue:  patient encouraged to make appointment with his/her dentist    Hearing/Vision:  No exam data present  Hearing/Vision  Do you or your family notice any trouble with your hearing that hasn't been managed with hearing aids?: No  Do you have difficulty driving, watching TV, or doing any of your daily activities because of your eyesight?: No  Have you had an eye exam within the past year?: (!) No  Hearing/Vision Interventions:  · Vision concerns:  patient encouraged to make appointment with his/her eye specialist      Personalized Preventive Plan   Current Health Maintenance Status  Immunization History   Administered Date(s) Administered    COVID-19, [place], Primary or Immunocompromised, PF, 100mcg/0.5mL 02/10/2021, 03/16/2021    Influenza, High Dose (Fluzone 65 yrs and older) 09/30/2016, 10/27/2017, 10/24/2018, 09/26/2019    Pneumococcal Conjugate 13-valent ([id number]) 01/07/2016    Pneumococcal Polysaccharide ([id number]) 03/29/2017    Tetanus 06/19/2014        Health Maintenance   Topic Date Due    DTaP/Tdap/Td vaccine (1 - Tdap) Never done    Shingles Vaccine (1 of 2) Never done    A1C test (Diabetic or Prediabetic)  08/12/2021    Potassium monitoring  08/12/2021    Creatinine monitoring  08/12/2021    Annual Wellness Visit (AWV)  08/13/2021    Flu vaccine (1) 09/01/2021    COVID-19 Vaccine (3 - Booster for Moderna series) 09/16/2021    Colon cancer screen colonoscopy  02/10/2022    Low dose CT lung screening  12/02/2022    Lipid screen  08/12/2025    Pneumococcal 65+ years Vaccine  Completed    AAA screen  Completed    Hepatitis A vaccine  Aged Out    Hepatitis B vaccine  Aged Out    Hib vaccine  Aged Out    Meningococcal (ACWY) vaccine  Aged Out    Hepatitis C screen  Discontinued     Recommendations for [employer] Due: see orders and patient instructions/AVS.  . Recommended screening schedule for the next 5-10 years is provided to the patient in written form: see Patient Instructions/AVS.    Ruth Israel was seen today for medicare awv. Diagnoses and all orders for this visit:    Routine general medical examination at a health care facility  -     Hemoglobin A1C  -     Comprehensive Metabolic Panel; Future    Essential hypertension  -     CBC Auto Differential; Future  -     Comprehensive Metabolic Panel; Future    Stage 2 chronic kidney disease             AWV      HTN. Stable.   Continue current medications.     Counselled to stop smoking.     Up to date on colonoscopy. Advance Care Planning   Advanced Care Planning: Discussed the patients choices for care and treatment in case of a health event that adversely affects decision-making abilities. Also discussed the patients long-term treatment options. Reviewed with the patient the [address] [place] [address] of [address] [place] [address] Declaration forms  Reviewed the process of designating a competent adult as an Agent (or -in-fact) that could take make health care decisions for the patient if incompetent. Patient was asked to complete the declaration forms, either acknowledge the forms by a public notary or an eligible witness and provide a signed copy to the practice office.   Time spent (minutes): 2

## 2021-12-03 DIAGNOSIS — N17.9 AKI (ACUTE KIDNEY INJURY) (HCC): Primary | ICD-10-CM

## 2021-12-03 LAB
A/G RATIO: 1.8 (ref 1.1–2.2)
ALBUMIN SERPL-MCNC: 4.8 G/DL (ref 3.4–5)
ALP BLD-CCNC: 51 U/L (ref 40–129)
ALT SERPL-CCNC: 21 U/L (ref 10–40)
ANION GAP SERPL CALCULATED.3IONS-SCNC: 16 MMOL/L (ref 3–16)
AST SERPL-CCNC: 19 U/L (ref 15–37)
BILIRUB SERPL-MCNC: 0.4 MG/DL (ref 0–1)
BUN BLDV-MCNC: 19 MG/DL (ref 7–20)
CALCIUM SERPL-MCNC: 9.2 MG/DL (ref 8.3–10.6)
CHLORIDE BLD-SCNC: 94 MMOL/L (ref 99–110)
CO2: 24 MMOL/L (ref 21–32)
CREAT SERPL-MCNC: 1.5 MG/DL (ref 0.8–1.3)
ESTIMATED AVERAGE GLUCOSE: 114 MG/DL
GFR AFRICAN AMERICAN: 56
GFR NON-AFRICAN AMERICAN: 46
GLUCOSE BLD-MCNC: 95 MG/DL (ref 70–99)
HBA1C MFR BLD: 5.6 %
POTASSIUM SERPL-SCNC: 3.8 MMOL/L (ref 3.5–5.1)
SODIUM BLD-SCNC: 134 MMOL/L (ref 136–145)
TOTAL PROTEIN: 7.5 G/DL (ref 6.4–8.2)

## 2021-12-06 ENCOUNTER — HOSPITAL ENCOUNTER (OUTPATIENT)
Dept: ULTRASOUND IMAGING | Age: 71
Discharge: HOME | End: 2021-12-06
Payer: MEDICARE

## 2021-12-06 DIAGNOSIS — N17.9 AKI (ACUTE KIDNEY INJURY) (HCC): ICD-10-CM

## 2021-12-06 LAB
ANION GAP SERPL CALCULATED.3IONS-SCNC: 17 MMOL/L (ref 3–16)
BUN BLDV-MCNC: 15 MG/DL (ref 7–20)
CALCIUM SERPL-MCNC: 9.8 MG/DL (ref 8.3–10.6)
CHLORIDE BLD-SCNC: 96 MMOL/L (ref 99–110)
CO2: 25 MMOL/L (ref 21–32)
CREAT SERPL-MCNC: 1.5 MG/DL (ref 0.8–1.3)
GFR AFRICAN AMERICAN: 56
GFR NON-AFRICAN AMERICAN: 46
GLUCOSE BLD-MCNC: 102 MG/DL (ref 70–99)
POTASSIUM SERPL-SCNC: 4.2 MMOL/L (ref 3.5–5.1)
SODIUM BLD-SCNC: 138 MMOL/L (ref 136–145)

## 2021-12-06 PROCEDURE — 76770 US EXAM ABDO BACK WALL COMP: CPT

## 2021-12-12 ENCOUNTER — TELEPHONE (OUTPATIENT)
Dept: CASE MANAGEMENT | Age: 71
End: 2021-12-12

## 2021-12-12 NOTE — TELEPHONE ENCOUNTER
Annual Lung Cancer Screening CT on 12/2/2021. LRAD1. Recommended screen in one year. Results letter mailed.     Thank you,  [facility]  [phone]

## 2022-01-21 RX ORDER — LISINOPRIL AND HYDROCHLOROTHIAZIDE 25; 20 MG/1; MG/1
TABLET ORAL
Qty: 90 TABLET | Refills: 0 | Status: SHIPPED | OUTPATIENT
Start: 2022-01-21 | End: 2022-04-22

## 2022-04-22 RX ORDER — LISINOPRIL AND HYDROCHLOROTHIAZIDE 25; 20 MG/1; MG/1
TABLET ORAL
Qty: 90 TABLET | Refills: 0 | Status: SHIPPED | OUTPATIENT
Start: 2022-04-22 | End: 2022-05-18 | Stop reason: SDUPTHER

## 2022-05-18 ENCOUNTER — OFFICE VISIT (OUTPATIENT)
Dept: INTERNAL MEDICINE CLINIC | Age: 72
End: 2022-05-18

## 2022-05-18 VITALS
HEART RATE: 64 BPM | DIASTOLIC BLOOD PRESSURE: 84 MMHG | HEIGHT: 75 IN | WEIGHT: 220 LBS | BODY MASS INDEX: 27.35 KG/M2 | SYSTOLIC BLOOD PRESSURE: 134 MMHG

## 2022-05-18 DIAGNOSIS — N18.31 STAGE 3A CHRONIC KIDNEY DISEASE (HCC): ICD-10-CM

## 2022-05-18 DIAGNOSIS — Z00.00 ROUTINE GENERAL MEDICAL EXAMINATION AT A HEALTH CARE FACILITY: ICD-10-CM

## 2022-05-18 DIAGNOSIS — I10 ESSENTIAL HYPERTENSION: Primary | ICD-10-CM

## 2022-05-18 DIAGNOSIS — I83.91 VARICOSE VEINS OF RIGHT LOWER EXTREMITY, UNSPECIFIED WHETHER COMPLICATED: ICD-10-CM

## 2022-05-18 PROBLEM — N18.30 CHRONIC RENAL DISEASE, STAGE III (HCC): Status: ACTIVE | Noted: 2022-05-18

## 2022-05-18 LAB
ANION GAP SERPL CALCULATED.3IONS-SCNC: 13 MMOL/L (ref 3–16)
BUN BLDV-MCNC: 23 MG/DL (ref 7–20)
CALCIUM SERPL-MCNC: 10 MG/DL (ref 8.3–10.6)
CHLORIDE BLD-SCNC: 98 MMOL/L (ref 99–110)
CO2: 26 MMOL/L (ref 21–32)
CREAT SERPL-MCNC: 1.5 MG/DL (ref 0.8–1.3)
GFR AFRICAN AMERICAN: 56
GFR NON-AFRICAN AMERICAN: 46
GLUCOSE BLD-MCNC: 104 MG/DL (ref 70–99)
POTASSIUM SERPL-SCNC: 5 MMOL/L (ref 3.5–5.1)
PROSTATE SPECIFIC ANTIGEN: 1.76 NG/ML (ref 0–4)
SODIUM BLD-SCNC: 137 MMOL/L (ref 136–145)

## 2022-05-18 PROCEDURE — G8417 CALC BMI ABV UP PARAM F/U: HCPCS | Performed by: INTERNAL MEDICINE

## 2022-05-18 PROCEDURE — 4040F PNEUMOC VAC/ADMIN/RCVD: CPT | Performed by: INTERNAL MEDICINE

## 2022-05-18 PROCEDURE — 3017F COLORECTAL CA SCREEN DOC REV: CPT | Performed by: INTERNAL MEDICINE

## 2022-05-18 PROCEDURE — 1123F ACP DISCUSS/DSCN MKR DOCD: CPT | Performed by: INTERNAL MEDICINE

## 2022-05-18 PROCEDURE — G8427 DOCREV CUR MEDS BY ELIG CLIN: HCPCS | Performed by: INTERNAL MEDICINE

## 2022-05-18 PROCEDURE — 99213 OFFICE O/P EST LOW 20 MIN: CPT | Performed by: INTERNAL MEDICINE

## 2022-05-18 PROCEDURE — 4004F PT TOBACCO SCREEN RCVD TLK: CPT | Performed by: INTERNAL MEDICINE

## 2022-05-18 RX ORDER — LISINOPRIL AND HYDROCHLOROTHIAZIDE 25; 20 MG/1; MG/1
TABLET ORAL
Qty: 90 TABLET | Refills: 1 | Status: SHIPPED | OUTPATIENT
Start: 2022-05-18

## 2022-05-18 RX ORDER — SILDENAFIL CITRATE 20 MG/1
TABLET ORAL
Qty: 20 TABLET | Refills: 5 | Status: SHIPPED | OUTPATIENT
Start: 2022-05-18

## 2022-05-18 ASSESSMENT — ENCOUNTER SYMPTOMS
BLOOD IN STOOL: 0
VOMITING: 0
NAUSEA: 0
COUGH: 0
ABDOMINAL PAIN: 0
CHEST TIGHTNESS: 0
WHEEZING: 0
DIARRHEA: 0
SHORTNESS OF BREATH: 0

## 2022-05-18 NOTE — PROGRESS NOTES
Nisa Disla (:  1950) is a 66 y.o. male,Established patient, here for evaluation of the following chief complaint(s):  Follow-up         ASSESSMENT/PLAN:  1. Essential hypertension  2. Stage 3a chronic kidney disease ([facility] [address]  -     Basic Metabolic Panel; Future  3. Varicose veins of right lower extremity, unspecified whether complicated  4. Routine general medical examination at a health care facility  -     PSA, Prostatic Specific Antigen; Future           HTN. Stable.   Continue current medications. Varicose veins   leg elevation   Avoid prolonged standing  Compression stocking  Does not wish to see vascular surgery    Stage 3 a CKD   avoid NSAIDS     Counselled to stop smoking. Schedule colonoscopy. Advised shingrix          Subjective   SUBJECTIVE/OBJECTIVE:  HPI  Is here for follow up of HTN and mild CKD  Patient's BP is controlled on current medications. Review of Systems   Constitutional: Negative. Negative for fatigue and fever. Respiratory: Negative for cough, chest tightness, shortness of breath and wheezing. Cardiovascular: Negative for chest pain and palpitations. Gastrointestinal: Negative for abdominal pain, blood in stool, diarrhea, nausea and vomiting. Objective   Physical Exam  Constitutional:       Appearance: He is well-developed. HENT:      Head: Normocephalic and atraumatic. Eyes:      Pupils: Pupils are equal, round, and reactive to light. Neck:      Thyroid: No thyromegaly. Cardiovascular:      Rate and Rhythm: Normal rate and regular rhythm. Heart sounds: Normal heart sounds. No murmur heard. No friction rub. No gallop. Comments: No carotid bruit  Pulmonary:      Effort: Pulmonary effort is normal. No respiratory distress. Breath sounds: Normal breath sounds. No wheezing or rales. Chest:      Chest wall: No tenderness. Abdominal:      General: Bowel sounds are normal. There is no distension. Palpations: Abdomen is soft. There is no mass. Tenderness: There is no abdominal tenderness. There is no guarding or rebound. Musculoskeletal:      Cervical back: Normal range of motion and neck supple. Neurological:      Mental Status: He is alert and oriented to person, place, and time. An electronic signature was used to authenticate this note.     --Diego Ziegler MD

## 2022-11-06 ENCOUNTER — TELEPHONE (OUTPATIENT)
Dept: CASE MANAGEMENT | Age: 72
End: 2022-11-06

## 2022-11-06 DIAGNOSIS — Z87.891 PERSONAL HISTORY OF TOBACCO USE: Primary | ICD-10-CM

## 2022-11-06 NOTE — TELEPHONE ENCOUNTER
Patient due for annual CT Lung screening 12/2/2022. If you would like patient to have screening, please place order for CT Lung screening ([facility] [address]). Patient due for annual CT Lung Screening. Reminder letter mailed.     Marialuisa G[address] Lung Navigator  [phone]

## 2023-01-07 ENCOUNTER — TELEPHONE (OUTPATIENT)
Dept: CASE MANAGEMENT | Age: 73
End: 2023-01-07

## 2023-01-07 NOTE — TELEPHONE ENCOUNTER
Patient due for annual CT Lung Screening. Reminder letter mailed. Central Scheduling phone number provided.     Marialuisa G[address] Lung Navigator  [phone]

## 2023-01-16 RX ORDER — LISINOPRIL AND HYDROCHLOROTHIAZIDE 25; 20 MG/1; MG/1
TABLET ORAL
Qty: 90 TABLET | Refills: 1 | Status: SHIPPED | OUTPATIENT
Start: 2023-01-16

## 2023-01-16 NOTE — TELEPHONE ENCOUNTER
----- Message from Jimena Holliday sent at 1/16/2023 10:29 AM EST -----  Contact: Jerry Storey [phone]  Patient needs refill     lisinopril-hydroCHLOROthiazide (PRINZIDE;ZESTORETIC) 20-25 MG per tablet         [facility] [address] LORI SOSAJOSÉ MIGUEL [address] [facility], [address] [phone] [facility] [phone] [address] [address]   Phone:  [phone]  Fax:  [phone]

## 2023-01-21 ENCOUNTER — HOSPITAL ENCOUNTER (OUTPATIENT)
Dept: CT IMAGING | Age: 73
Discharge: HOME | End: 2023-01-21
Payer: MEDICARE

## 2023-01-21 DIAGNOSIS — Z87.891 PERSONAL HISTORY OF TOBACCO USE: ICD-10-CM

## 2023-01-21 PROCEDURE — 71271 CT THORAX LUNG CANCER SCR C-: CPT

## 2023-02-02 ENCOUNTER — OFFICE VISIT (OUTPATIENT)
Dept: INTERNAL MEDICINE CLINIC | Age: 73
End: 2023-02-02

## 2023-02-02 VITALS
SYSTOLIC BLOOD PRESSURE: 138 MMHG | HEART RATE: 72 BPM | BODY MASS INDEX: 27.6 KG/M2 | WEIGHT: 222 LBS | HEIGHT: 75 IN | DIASTOLIC BLOOD PRESSURE: 82 MMHG

## 2023-02-02 DIAGNOSIS — I25.84 CORONARY ARTERY CALCIFICATION: ICD-10-CM

## 2023-02-02 DIAGNOSIS — I10 ESSENTIAL HYPERTENSION: Primary | ICD-10-CM

## 2023-02-02 DIAGNOSIS — N18.31 STAGE 3A CHRONIC KIDNEY DISEASE (HCC): ICD-10-CM

## 2023-02-02 DIAGNOSIS — I25.10 CORONARY ARTERY CALCIFICATION: ICD-10-CM

## 2023-02-02 PROCEDURE — 3075F SYST BP GE 130 - 139MM HG: CPT | Performed by: INTERNAL MEDICINE

## 2023-02-02 PROCEDURE — 1123F ACP DISCUSS/DSCN MKR DOCD: CPT | Performed by: INTERNAL MEDICINE

## 2023-02-02 PROCEDURE — 4004F PT TOBACCO SCREEN RCVD TLK: CPT | Performed by: INTERNAL MEDICINE

## 2023-02-02 PROCEDURE — 99213 OFFICE O/P EST LOW 20 MIN: CPT | Performed by: INTERNAL MEDICINE

## 2023-02-02 PROCEDURE — G8417 CALC BMI ABV UP PARAM F/U: HCPCS | Performed by: INTERNAL MEDICINE

## 2023-02-02 PROCEDURE — G8427 DOCREV CUR MEDS BY ELIG CLIN: HCPCS | Performed by: INTERNAL MEDICINE

## 2023-02-02 PROCEDURE — G8484 FLU IMMUNIZE NO ADMIN: HCPCS | Performed by: INTERNAL MEDICINE

## 2023-02-02 PROCEDURE — 3079F DIAST BP 80-89 MM HG: CPT | Performed by: INTERNAL MEDICINE

## 2023-02-02 PROCEDURE — 3017F COLORECTAL CA SCREEN DOC REV: CPT | Performed by: INTERNAL MEDICINE

## 2023-02-02 ASSESSMENT — ENCOUNTER SYMPTOMS
VOMITING: 0
SHORTNESS OF BREATH: 0
CHEST TIGHTNESS: 0
WHEEZING: 0
COUGH: 0
ABDOMINAL PAIN: 0
NAUSEA: 0
DIARRHEA: 0
BLOOD IN STOOL: 0

## 2023-02-02 NOTE — PROGRESS NOTES
Namita Carter (:  1950) is a 67 y.o. male,Established patient, here for evaluation of the following chief complaint(s):  Follow-up         ASSESSMENT/PLAN:  1. Essential hypertension  2. Coronary artery calcification  -     CT CARDIAC CALCIUM SCORING; Future  3. Stage 3a chronic kidney disease (HCC)              HTN. Stable. Continue current medications. Cor artery calcifications  Arrange for ct cardiac calcium score     Varicose veins   leg elevation   Avoid prolonged standing  Compression stocking  Does not wish to see vascular surgery     Stage 3 a CKD   avoid NSAIDS     Counselled to stop smoking. Advised shingrix    Subjective   SUBJECTIVE/OBJECTIVE:  HPI  Is here for follow up of HTN and mild CKD  Patient's BP is controlled on current medications. Review of Systems   Constitutional: Negative. Negative for fatigue and fever. Respiratory:  Negative for cough, chest tightness, shortness of breath and wheezing. Cardiovascular:  Negative for chest pain and palpitations. Gastrointestinal:  Negative for abdominal pain, blood in stool, diarrhea, nausea and vomiting. Genitourinary:  Negative for dysuria and hematuria. Neurological:  Negative for light-headedness and headaches. Objective   Physical Exam  Constitutional:       Appearance: He is well-developed. HENT:      Head: Normocephalic and atraumatic. Eyes:      Pupils: Pupils are equal, round, and reactive to light. Neck:      Thyroid: No thyromegaly. Cardiovascular:      Rate and Rhythm: Normal rate and regular rhythm. Heart sounds: Normal heart sounds. No murmur heard. No friction rub. No gallop. Comments: No carotid bruit  Pulmonary:      Effort: Pulmonary effort is normal. No respiratory distress. Breath sounds: Normal breath sounds. No wheezing or rales. Chest:      Chest wall: No tenderness. Abdominal:      General: Bowel sounds are normal. There is no distension.       Palpations: Abdomen is soft. There is no mass. Tenderness: There is no abdominal tenderness. There is no guarding or rebound. Musculoskeletal:      Cervical back: Normal range of motion and neck supple. Neurological:      Mental Status: He is alert and oriented to person, place, and time. An electronic signature was used to authenticate this note.     --Denita Zuñiga MD

## 2023-02-09 ENCOUNTER — HOSPITAL ENCOUNTER (OUTPATIENT)
Dept: CT IMAGING | Age: 73
Discharge: HOME | End: 2023-02-09
Payer: MEDICARE

## 2023-02-09 DIAGNOSIS — I25.10 CORONARY ARTERY CALCIFICATION: ICD-10-CM

## 2023-02-09 DIAGNOSIS — I25.84 CORONARY ARTERY CALCIFICATION: ICD-10-CM

## 2023-02-09 PROCEDURE — 75571 CT HRT W/O DYE W/CA TEST: CPT

## 2023-02-13 ENCOUNTER — HOSPITAL ENCOUNTER (OUTPATIENT)
Age: 73
Discharge: HOME | End: 2023-02-13

## 2023-02-13 ENCOUNTER — TELEPHONE (OUTPATIENT)
Dept: INTERNAL MEDICINE CLINIC | Age: 73
End: 2023-02-13

## 2023-02-13 DIAGNOSIS — R19.7 DIARRHEA, UNSPECIFIED TYPE: Primary | ICD-10-CM

## 2023-02-13 DIAGNOSIS — I25.10 CORONARY ARTERY CALCIFICATION: Primary | ICD-10-CM

## 2023-02-13 DIAGNOSIS — I25.84 CORONARY ARTERY CALCIFICATION: Primary | ICD-10-CM

## 2023-02-13 RX ORDER — ROSUVASTATIN CALCIUM 10 MG/1
10 TABLET, COATED ORAL NIGHTLY
Qty: 30 TABLET | Refills: 5 | Status: SHIPPED | OUTPATIENT
Start: 2023-02-13

## 2023-02-13 NOTE — TELEPHONE ENCOUNTER
----- Message from Mirian Ceja MD sent at 2/13/2023  1:44 PM EST -----  His calcium score is very high    He should see a cardiologist   He should stop smoking  Take baby aspirin  Start crestor 10 mg hs # 30 5 rf.to decrease chances of a heart attack

## 2023-02-13 NOTE — TELEPHONE ENCOUNTER
Spouse states pt has had a stomach virus with nausea and diarrhea for about 9 days now. Per Dr. Alma Cannon- stool culture and C-Diff testing.

## 2023-02-14 ENCOUNTER — HOSPITAL ENCOUNTER (OUTPATIENT)
Age: 73
Discharge: HOME | End: 2023-02-14
Payer: MEDICARE

## 2023-02-14 DIAGNOSIS — R19.7 DIARRHEA, UNSPECIFIED TYPE: ICD-10-CM

## 2023-02-14 LAB — C DIFF TOXIN/ANTIGEN: NORMAL

## 2023-02-14 PROCEDURE — 87506 IADNA-DNA/RNA PROBE TQ 6-11: CPT

## 2023-02-14 PROCEDURE — 87449 NOS EACH ORGANISM AG IA: CPT

## 2023-02-14 PROCEDURE — 87324 CLOSTRIDIUM AG IA: CPT

## 2023-02-15 LAB — GI BACTERIAL PATHOGENS BY PCR: NORMAL

## 2023-02-21 ENCOUNTER — HOSPITAL ENCOUNTER (OUTPATIENT)
Age: 73
Discharge: HOME | End: 2023-02-21
Payer: MEDICARE

## 2023-02-21 LAB
BASOPHILS ABSOLUTE: 0 K/UL (ref 0–0.2)
BASOPHILS RELATIVE PERCENT: 0.8 %
EOSINOPHILS ABSOLUTE: 0.2 K/UL (ref 0–0.6)
EOSINOPHILS RELATIVE PERCENT: 4.4 %
HCT VFR BLD CALC: 41.8 % (ref 40.5–52.5)
HEMOGLOBIN: 14.6 G/DL (ref 13.5–17.5)
INR BLD: 1.04 (ref 0.87–1.14)
LYMPHOCYTES ABSOLUTE: 2.1 K/UL (ref 1–5.1)
LYMPHOCYTES RELATIVE PERCENT: 41 %
MCH RBC QN AUTO: 32.9 PG (ref 26–34)
MCHC RBC AUTO-ENTMCNC: 35 G/DL (ref 31–36)
MCV RBC AUTO: 94 FL (ref 80–100)
MONOCYTES ABSOLUTE: 0.4 K/UL (ref 0–1.3)
MONOCYTES RELATIVE PERCENT: 7.9 %
NEUTROPHILS ABSOLUTE: 2.4 K/UL (ref 1.7–7.7)
NEUTROPHILS RELATIVE PERCENT: 45.9 %
PDW BLD-RTO: 13.4 % (ref 12.4–15.4)
PLATELET # BLD: 174 K/UL (ref 135–450)
PMV BLD AUTO: 8.1 FL (ref 5–10.5)
PROTHROMBIN TIME: 13.4 SEC (ref 11.7–14.5)
RBC # BLD: 4.45 M/UL (ref 4.2–5.9)
WBC # BLD: 5.1 K/UL (ref 4–11)

## 2023-02-21 PROCEDURE — 85610 PROTHROMBIN TIME: CPT

## 2023-02-21 PROCEDURE — 83690 ASSAY OF LIPASE: CPT

## 2023-02-21 PROCEDURE — 85025 COMPLETE CBC W/AUTO DIFF WBC: CPT

## 2023-02-21 PROCEDURE — 80053 COMPREHEN METABOLIC PANEL: CPT

## 2023-02-21 PROCEDURE — 36415 COLL VENOUS BLD VENIPUNCTURE: CPT

## 2023-02-21 PROCEDURE — 83013 H PYLORI (C-13) BREATH: CPT

## 2023-02-21 PROCEDURE — 83516 IMMUNOASSAY NONANTIBODY: CPT

## 2023-02-21 PROCEDURE — 82784 ASSAY IGA/IGD/IGG/IGM EACH: CPT

## 2023-02-22 ENCOUNTER — HOSPITAL ENCOUNTER (OUTPATIENT)
Age: 73
Discharge: HOME | End: 2023-02-22
Payer: MEDICARE

## 2023-02-22 LAB
A/G RATIO: 1.6 (ref 1.1–2.2)
ALBUMIN SERPL-MCNC: 4.5 G/DL (ref 3.4–5)
ALP BLD-CCNC: 53 U/L (ref 40–129)
ALT SERPL-CCNC: 23 U/L (ref 10–40)
ANION GAP SERPL CALCULATED.3IONS-SCNC: 12 MMOL/L (ref 3–16)
AST SERPL-CCNC: 19 U/L (ref 15–37)
BILIRUB SERPL-MCNC: 0.4 MG/DL (ref 0–1)
BUN BLDV-MCNC: 16 MG/DL (ref 7–20)
CALCIUM SERPL-MCNC: 9.8 MG/DL (ref 8.3–10.6)
CHLORIDE BLD-SCNC: 95 MMOL/L (ref 99–110)
CO2: 28 MMOL/L (ref 21–32)
CREAT SERPL-MCNC: 1.4 MG/DL (ref 0.8–1.3)
GFR SERPL CREATININE-BSD FRML MDRD: 53 ML/MIN/{1.73_M2}
GLUCOSE BLD-MCNC: 98 MG/DL (ref 70–99)
IGA: 265 MG/DL (ref 70–400)
LIPASE: 45 U/L (ref 13–60)
POTASSIUM SERPL-SCNC: 4.1 MMOL/L (ref 3.5–5.1)
SODIUM BLD-SCNC: 135 MMOL/L (ref 136–145)
TISSUE TRANSGLUTAMINASE IGA: <0.5 U/ML (ref 0–14)
TOTAL PROTEIN: 7.4 G/DL (ref 6.4–8.2)

## 2023-02-22 PROCEDURE — 82705 FATS/LIPIDS FECES QUAL: CPT

## 2023-02-22 PROCEDURE — 82653 EL-1 FECAL QUANTITATIVE: CPT

## 2023-02-22 PROCEDURE — 83993 ASSAY FOR CALPROTECTIN FECAL: CPT

## 2023-02-23 LAB — H PYLORI BREATH TEST: NEGATIVE

## 2023-02-24 LAB — CALPROTECTIN, FECAL: 21 UG/G

## 2023-02-25 LAB
FECAL NEUTRAL FAT: NORMAL
FECAL SPLIT FATS: NORMAL

## 2023-03-01 ENCOUNTER — HOSPITAL ENCOUNTER (OUTPATIENT)
Dept: CT IMAGING | Age: 73
Discharge: HOME | End: 2023-03-01
Payer: MEDICARE

## 2023-03-01 DIAGNOSIS — R19.8 CHANGE IN BOWEL FUNCTION: ICD-10-CM

## 2023-03-01 DIAGNOSIS — K92.1 MELENA: ICD-10-CM

## 2023-03-01 DIAGNOSIS — R19.7 DIARRHEA, UNSPECIFIED TYPE: ICD-10-CM

## 2023-03-01 DIAGNOSIS — R63.4 WEIGHT LOSS: ICD-10-CM

## 2023-03-01 PROCEDURE — 74177 CT ABD & PELVIS W/CONTRAST: CPT

## 2023-03-01 PROCEDURE — 6360000004 HC RX CONTRAST MEDICATION: Performed by: INTERNAL MEDICINE

## 2023-03-01 RX ADMIN — IOPAMIDOL 75 ML: 755 INJECTION, SOLUTION INTRAVENOUS at 17:04

## 2023-03-01 RX ADMIN — DIATRIZOATE MEGLUMINE AND DIATRIZOATE SODIUM 12 ML: 660; 100 LIQUID ORAL; RECTAL at 17:05

## 2023-03-13 NOTE — PROGRESS NOTES
CARDIOLOGY CONSULTATION        Patient Name: Mohan Jerome  Primary Care physician: Ekaterina Ortega MD    Reason for Referral/Chief Complaint: Mohan Jerome is a 67 y.o. patient who is referred to cardiology clinic today for evaluation and treatment of coronary artery calcification. History of Present Illness:   Mohan Jerome is a 67 y.o. male presenting as a new cardiac patient referred by Ekaterina Ortega MD. He has a medical history of HTN, Stage 3 CKD. CT Lung 1/23/2023 demonstrates Atherosclerotic changes of coronary arteries. CT Calcium Score 2/9/2023 total 1228. Patient started on Crestor 10  CT Abdomen 3/1/2023 demonstrated abdominal aortic aneurysm measuring 3.2 cm. Atherosclerotic disease of the abdominal aorta     Today, 3/14/2023, he is her with his wife. He says he recently had testing with his PCP that came back with an abnormal CT calcium score. He is an everyday smoker. He has been taking lisinopril since 1999. He gets shortness of breath walking up the stairs. He can walk around the block without shortness of breath. He denies shortness of breath when laying down at night. He can do yard work and work on his car without limitations. He denies chest discomfort with activity. He gets dizzy when going from a sitting to standing too fast. No LOC. Patient is taking all cardiac medications as prescribed and tolerates them well. Denies palpitations. Denies paroxysmal nocturnal dyspnea, orthopnea, increasing lower extremity edema or weight gain. Past Medical History:   has a past medical history of Complete rupture of rotator cuff, Impotence of organic origin, PONV (postoperative nausea and vomiting), and Unspecified essential hypertension. Surgical History:   has a past surgical history that includes shoulder surgery (Right, 2007); [place] tooth extraction; and Shoulder arthroscopy (Left, 01/24/2017).      Social History:   reports that he has been smoking cigarettes. He has a 42.00 pack-year smoking history. He has never used smokeless tobacco. He reports current alcohol use of about 14.0 standard drinks per week. He reports that he does not use drugs. 2 beers/day    Family History:  Father- cardiac stents in his 66's, passed at 80 y.o of colon cancer  Mother- valvular heart disease, stroke, passed in her [de-identified]   3 Siblings- No known cardiac disease  2 sons, 1 daughter- HTN    Home Medications:  Were reviewed and are listed in nursing record and/or below  Prior to Admission medications    Medication Sig Start Date End Date Taking? Authorizing Provider   rosuvastatin (CRESTOR) 10 MG tablet Take 1 tablet by mouth nightly 2/13/23  Yes Rita Lee MD   lisinopril-hydroCHLOROthiazide (PRINZIDE;ZESTORETIC) 20-25 MG per tablet Take 1 tablet by mouth once daily 1/16/23  Yes Rita Lee MD   sildenafil (REVATIO) 20 MG tablet TAKE ONE TABLET BY MOUTH DAILY AS NEEDED FOR ERECTILE DYSFUNCTION 5/18/22  Yes Rita Lee MD        CURRENT Medications:  No current facility-administered medications for this visit. Allergies:  Patient has no known allergies. Review of Systems:   A 14 point review of symptoms completed. Pertinent positives identified in the HPI, all other review of symptoms negative as below. Objective:     Vitals:    03/14/23 0925   BP: 118/70   Pulse: 63   SpO2: 99%    Weight: 221 lb 5 oz (100.4 kg)       PHYSICAL EXAM:    General:  Pleasant man in no acute distress    Head:  Normocephalic, atraumatic   Eyes:  Conjunctiva/corneas clear, anicteric sclerae    Nose: Nares normal, no drainage or sinus tenderness   Throat: No abnormalities of the lips, oral mucosa or tongue. Neck: Trachea midline.  Neck supple with no lymphadenopathy, thyroid not enlarged, symmetric, no tenderness/mass/nodules    Lungs:   Clear to auscultation bilaterally, no wheezes, no rales, no respiratory distress   Chest Wall:  No deformity or tenderness to palpation   Heart:  Regular rate and rhythm, normal S1, normal S2, soft II/VI LETTY at RUSB without radiation to the carotids, no rub, no S3/S4, PMI non-displaced. Abdomen:   Soft, non-tender, with normoactive bowel sounds. No masses, no hepatosplenomegaly   Extremities: No cyanosis, clubbing or pitting edema. Vascular: Varicosities BLE, 2+ radial, dorsalis pedis and posterior tibial pulses bilaterally. Brisk carotid upstrokes without carotid bruit. Skin: Skin color, texture, turgor are normal with no rashes or ulceration. Pysch: Euthymic mood, appropriate affect   Neurologic: Oriented to person, place and time. No slurred speech or facial asymmetry. No motor or sensory deficits on gross examination.          Labs:   CBC:   Lab Results   Component Value Date/Time    WBC 5.1 02/21/2023 04:18 PM    RBC 4.45 02/21/2023 04:18 PM    HGB 14.6 02/21/2023 04:18 PM    HCT 41.8 02/21/2023 04:18 PM    MCV 94.0 02/21/2023 04:18 PM    RDW 13.4 02/21/2023 04:18 PM     02/21/2023 04:18 PM     CMP:  Lab Results   Component Value Date/Time     02/21/2023 04:18 PM    K 4.1 02/21/2023 04:18 PM    CL 95 02/21/2023 04:18 PM    CO2 28 02/21/2023 04:18 PM    BUN 16 02/21/2023 04:18 PM    CREATININE 1.4 02/21/2023 04:18 PM    GFRAA 56 05/18/2022 10:33 AM    AGRATIO 1.6 02/21/2023 04:18 PM    LABGLOM 53 02/21/2023 04:18 PM    GLUCOSE 98 02/21/2023 04:18 PM    GLUCOSE 103 01/23/2012 04:22 PM    PROT 7.4 02/21/2023 04:18 PM    PROT 7.1 01/23/2012 04:22 PM    CALCIUM 9.8 02/21/2023 04:18 PM    BILITOT 0.4 02/21/2023 04:18 PM    ALKPHOS 53 02/21/2023 04:18 PM    AST 19 02/21/2023 04:18 PM    ALT 23 02/21/2023 04:18 PM     PT/INR:  No results found for: PTINR  HgBA1c:  Lab Results   Component Value Date    LABA1C 5.6 12/02/2021     No results found for: CKTOTAL, CKMB, CKMBINDEX, TROPONINI      Cardiac Data:     EKG: 3/14/2023 personally reviewed with my interpretation:  Sinus  Rhythm   NORMAL     CT Abdomen Pelvis 3/2/2023  Infrarenal abdominal aortic aneurysm measuring 3.2 cm. Atherosclerotic disease of the abdominal aorta. CT Cardiac Calcium Score 2023  FINDINGS: LEFT MAIN: 38  LEFT ANTERIOR DESCENDING: [address]: 190  RIGHT CORONARY ARTERY: 629  TOTAL [place] CALCIUM SCORE: 0519     CT Lung Screen 2023  Atherosclerotic changes of coronary arteries    Impression and Plan:      Dyspnea with exertion  Coronary artery calcification/coronary artery disease by CT  Cardiac murmur - aortic sclerosis/stenosis suspected  Abdominal aortic enlargement, 3.2 cm  Hypertension  Chronic kidney disease stage III  Tobacco use disorder      Patient Active Problem List   Diagnosis    Complete rupture of rotator cuff    Essential hypertension    Impotence of organic origin    CKD (chronic kidney disease)    Chronic renal disease, stage III ([facility] [address] [365914]    Varicose veins of right lower extremity    Coronary artery calcification           PLAN:  Myoview Stress test for further evaluation of severe CAC/risk stratification in setting of dyspnea with exertion. Echocardiogram to assess heart function, strength and valves given murmur  Labs now: fasting lipids  Continue current cardiac medications without changes   I Strongly advised complete smoking cessation. Resources given to assist quitting including the followin-800-QUIT NOW. Mediterranean Diet for cholesterol management    ~Fresh fruits, vegetables, fish, whole grains, avocados, almonds, low sodium, avoid red meats and fried foods  Will continue to monitor abdominal aortic aneurysm with serial imaging as appropriate - very mild enlargement at this point. Consider 1 year repeat. Follow up with me after further testing in [place] in 8 weeks      This note was scribed in the presence of Dr. Chacha Crowley  by Kaylah Tenorio RN. The scribes documentation has been prepared under my direction and personally reviewed by me in its entirety.   I confirm that the note above accurately reflects all work, treatment, procedures, and medical decision making performed by me. Steffen Perez MD, personally performed the services described in this documentation as scribed by  Dallas Webb RN. in my presence, and it is both accurate and complete to the best of our ability. I will address the patient's cardiac risk factors and adjusted pharmacologic treatment as needed. In addition, I have reinforced the need for patient directed risk factor modification. All questions and concerns were addressed to the patient/family. Alternatives to my treatment were discussed. Thank you for allowing us to participate in the care of Kirk Nguyen. Please call me with any questions [phone].     Ivonne Guillory MD, [facility]  Cardiovascular Disease  [facility]  [phone] [facility]  [phone] [facility]  3/14/2023 9:37 AM

## 2023-03-14 ENCOUNTER — OFFICE VISIT (OUTPATIENT)
Dept: CARDIOLOGY CLINIC | Age: 73
End: 2023-03-14
Payer: MEDICARE

## 2023-03-14 VITALS
SYSTOLIC BLOOD PRESSURE: 118 MMHG | OXYGEN SATURATION: 99 % | HEIGHT: 75 IN | BODY MASS INDEX: 27.52 KG/M2 | HEART RATE: 63 BPM | WEIGHT: 221.31 LBS | DIASTOLIC BLOOD PRESSURE: 70 MMHG

## 2023-03-14 DIAGNOSIS — I25.84 CORONARY ARTERY CALCIFICATION: ICD-10-CM

## 2023-03-14 DIAGNOSIS — R06.02 SOB (SHORTNESS OF BREATH): ICD-10-CM

## 2023-03-14 DIAGNOSIS — I25.10 CORONARY ARTERY CALCIFICATION: ICD-10-CM

## 2023-03-14 DIAGNOSIS — I49.9 IRREGULAR HEART RATE: Primary | ICD-10-CM

## 2023-03-14 DIAGNOSIS — R01.1 MURMUR: ICD-10-CM

## 2023-03-14 PROCEDURE — G8427 DOCREV CUR MEDS BY ELIG CLIN: HCPCS | Performed by: INTERNAL MEDICINE

## 2023-03-14 PROCEDURE — G8417 CALC BMI ABV UP PARAM F/U: HCPCS | Performed by: INTERNAL MEDICINE

## 2023-03-14 PROCEDURE — 99204 OFFICE O/P NEW MOD 45 MIN: CPT | Performed by: INTERNAL MEDICINE

## 2023-03-14 PROCEDURE — 3078F DIAST BP <80 MM HG: CPT | Performed by: INTERNAL MEDICINE

## 2023-03-14 PROCEDURE — 4004F PT TOBACCO SCREEN RCVD TLK: CPT | Performed by: INTERNAL MEDICINE

## 2023-03-14 PROCEDURE — 3017F COLORECTAL CA SCREEN DOC REV: CPT | Performed by: INTERNAL MEDICINE

## 2023-03-14 PROCEDURE — 3074F SYST BP LT 130 MM HG: CPT | Performed by: INTERNAL MEDICINE

## 2023-03-14 PROCEDURE — 1123F ACP DISCUSS/DSCN MKR DOCD: CPT | Performed by: INTERNAL MEDICINE

## 2023-03-14 PROCEDURE — 93000 ELECTROCARDIOGRAM COMPLETE: CPT | Performed by: INTERNAL MEDICINE

## 2023-03-14 PROCEDURE — G8484 FLU IMMUNIZE NO ADMIN: HCPCS | Performed by: INTERNAL MEDICINE

## 2023-03-14 NOTE — PATIENT INSTRUCTIONS
Your provider has ordered testing for further evaluation. An order/prescription has been included in your paper work. To schedule outpatient testing, contact Central Scheduling by calling [employer] ([phone]).   PLAN:  Stress test myoview to assess heart  Echocardiogram to assess heart function, strength and valves given murmur  Labs now: fasting lipids  Continue current cardiac medications without changes   Discussed the importance of smoking cessation for overall improved health 1-800-QUIT-NOW  Mediterranean Diet for cholesterol management    ~Fresh fruits, vegetables, fish, whole grains, avocados, almonds, low sodium, avoid red meats and fried foods  Will continue to monitor abdominal aortic aneurysm  Follow up with me after testing in [facility] 12 in 8 weeks

## 2023-03-20 ENCOUNTER — HOSPITAL ENCOUNTER (OUTPATIENT)
Age: 73
Discharge: HOME | End: 2023-03-20
Payer: MEDICARE

## 2023-03-20 ENCOUNTER — HOSPITAL ENCOUNTER (OUTPATIENT)
Dept: NON INVASIVE DIAGNOSTICS | Age: 73
Discharge: HOME | End: 2023-03-20
Payer: MEDICARE

## 2023-03-20 ENCOUNTER — TELEPHONE (OUTPATIENT)
Dept: CARDIOLOGY CLINIC | Age: 73
End: 2023-03-20

## 2023-03-20 ENCOUNTER — HOSPITAL ENCOUNTER (OUTPATIENT)
Dept: NUCLEAR MEDICINE | Age: 73
Discharge: HOME | End: 2023-03-20
Payer: MEDICARE

## 2023-03-20 DIAGNOSIS — I25.84 CORONARY ARTERY CALCIFICATION: ICD-10-CM

## 2023-03-20 DIAGNOSIS — I25.10 CORONARY ARTERY CALCIFICATION: ICD-10-CM

## 2023-03-20 DIAGNOSIS — R06.02 SOB (SHORTNESS OF BREATH): ICD-10-CM

## 2023-03-20 LAB
CHOLEST SERPL-MCNC: 132 MG/DL (ref 0–199)
HDLC SERPL-MCNC: 45 MG/DL (ref 40–60)
LDLC SERPL CALC-MCNC: 65 MG/DL
LV EF: 64 %
LVEF MODALITY: NORMAL
TRIGL SERPL-MCNC: 112 MG/DL (ref 0–150)
VLDLC SERPL CALC-MCNC: 22 MG/DL

## 2023-03-20 PROCEDURE — 3430000000 HC RX DIAGNOSTIC RADIOPHARMACEUTICAL: Performed by: INTERNAL MEDICINE

## 2023-03-20 PROCEDURE — 93017 CV STRESS TEST TRACING ONLY: CPT

## 2023-03-20 PROCEDURE — A9502 TC99M TETROFOSMIN: HCPCS | Performed by: INTERNAL MEDICINE

## 2023-03-20 PROCEDURE — 80061 LIPID PANEL: CPT

## 2023-03-20 PROCEDURE — 78452 HT MUSCLE IMAGE SPECT MULT: CPT

## 2023-03-20 PROCEDURE — 36415 COLL VENOUS BLD VENIPUNCTURE: CPT

## 2023-03-20 RX ADMIN — TETROFOSMIN 33.7 MILLICURIE: 1.38 INJECTION, POWDER, LYOPHILIZED, FOR SOLUTION INTRAVENOUS at 08:35

## 2023-03-20 RX ADMIN — TETROFOSMIN 11.3 MILLICURIE: 1.38 INJECTION, POWDER, LYOPHILIZED, FOR SOLUTION INTRAVENOUS at 07:16

## 2023-03-20 NOTE — PROGRESS NOTES
Pt completed stress portion of cardiac stress test. Pt c/o sob, and bilateral leg fatigue. All symptoms resolved in recovery. Pt is discharged to nuclear department for final scan. Nuclear tech will remove PIV. Discharge instructions given to pt. Pt verbalizes understanding to discharge instructions.

## 2023-03-20 NOTE — TELEPHONE ENCOUNTER
----- Message from Prasanth Justin MD sent at 3/20/2023 11:48 AM EDT -----  Please let the patient know I reviewed stress test which is normal.  No evidence of underlying blockage, reassuring study. I will follow-up echo results.

## 2023-03-21 ENCOUNTER — TELEPHONE (OUTPATIENT)
Dept: CARDIOLOGY CLINIC | Age: 73
End: 2023-03-21

## 2023-03-21 NOTE — TELEPHONE ENCOUNTER
----- Message from Kel Baptiste MD sent at 3/20/2023  6:20 PM EDT -----  Lipids are at goal, no changes.   Follow-up as planned

## 2023-03-30 ENCOUNTER — HOSPITAL ENCOUNTER (OUTPATIENT)
Dept: NON INVASIVE DIAGNOSTICS | Age: 73
Discharge: HOME | End: 2023-03-30
Payer: MEDICARE

## 2023-03-30 DIAGNOSIS — R01.1 MURMUR: ICD-10-CM

## 2023-03-30 DIAGNOSIS — I25.10 CORONARY ARTERY CALCIFICATION: ICD-10-CM

## 2023-03-30 DIAGNOSIS — I25.84 CORONARY ARTERY CALCIFICATION: ICD-10-CM

## 2023-03-30 LAB
LV EF: 55 %
LVEF MODALITY: NORMAL

## 2023-03-30 PROCEDURE — 93306 TTE W/DOPPLER COMPLETE: CPT

## 2023-03-31 ENCOUNTER — TELEPHONE (OUTPATIENT)
Dept: CARDIOLOGY CLINIC | Age: 73
End: 2023-03-31

## 2023-03-31 NOTE — TELEPHONE ENCOUNTER
Pt and pts wife returned call. Message given. Pt would like to know if he still needs to the 8w follow up with rjm? They would also like to know if the echo in 1-2 years is how we monitor abdominal aortic aneurysm?

## 2023-03-31 NOTE — TELEPHONE ENCOUNTER
----- Message from Joan Prasad MD sent at 3/30/2023  1:25 PM EDT -----  Please notify patient that their echo looks ok  Heart fxn normal  Aorta mild enlarged, just needs to be monitored.  Consider repeat echo 1-2 years

## 2023-03-31 NOTE — TELEPHONE ENCOUNTER
STEVE for patient to call back. Yes to follow up. He has known coronary artery calcifications per his CT scan. (Discussed at visit). He also has the mildly enlarged Aorta that we will follow. And yes it is followed by an echocardiogram every 1-2 years.

## 2023-04-06 NOTE — TELEPHONE ENCOUNTER
04/06-Called ([phone]) unable to make contact, STEVE. Pt needs an 8 wk f/u w/RJM. No other numbers listed, letter has been sent.

## 2023-07-03 RX ORDER — LISINOPRIL AND HYDROCHLOROTHIAZIDE 25; 20 MG/1; MG/1
TABLET ORAL
Qty: 90 TABLET | Refills: 0 | Status: SHIPPED | OUTPATIENT
Start: 2023-07-03

## 2023-07-20 ENCOUNTER — OFFICE VISIT (OUTPATIENT)
Dept: INTERNAL MEDICINE CLINIC | Age: 73
End: 2023-07-20

## 2023-07-20 VITALS
HEIGHT: 75 IN | HEART RATE: 60 BPM | BODY MASS INDEX: 26.36 KG/M2 | WEIGHT: 212 LBS | DIASTOLIC BLOOD PRESSURE: 80 MMHG | SYSTOLIC BLOOD PRESSURE: 134 MMHG

## 2023-07-20 DIAGNOSIS — I25.84 CORONARY ARTERY CALCIFICATION: ICD-10-CM

## 2023-07-20 DIAGNOSIS — I10 ESSENTIAL HYPERTENSION: ICD-10-CM

## 2023-07-20 DIAGNOSIS — Z00.00 MEDICARE ANNUAL WELLNESS VISIT, SUBSEQUENT: Primary | ICD-10-CM

## 2023-07-20 DIAGNOSIS — Z00.00 ROUTINE GENERAL MEDICAL EXAMINATION AT A HEALTH CARE FACILITY: ICD-10-CM

## 2023-07-20 DIAGNOSIS — I25.10 CORONARY ARTERY CALCIFICATION: ICD-10-CM

## 2023-07-20 DIAGNOSIS — N18.31 STAGE 3A CHRONIC KIDNEY DISEASE (HCC): ICD-10-CM

## 2023-07-20 LAB
ANION GAP SERPL CALCULATED.3IONS-SCNC: 10 MMOL/L (ref 3–16)
BUN SERPL-MCNC: 20 MG/DL (ref 7–20)
CALCIUM SERPL-MCNC: 9.9 MG/DL (ref 8.3–10.6)
CHLORIDE SERPL-SCNC: 101 MMOL/L (ref 99–110)
CO2 SERPL-SCNC: 29 MMOL/L (ref 21–32)
CREAT SERPL-MCNC: 1.8 MG/DL (ref 0.8–1.3)
GFR SERPLBLD CREATININE-BSD FMLA CKD-EPI: 39 ML/MIN/{1.73_M2}
GLUCOSE SERPL-MCNC: 106 MG/DL (ref 70–99)
POTASSIUM SERPL-SCNC: 4.4 MMOL/L (ref 3.5–5.1)
SODIUM SERPL-SCNC: 140 MMOL/L (ref 136–145)

## 2023-07-20 PROCEDURE — G0439 PPPS, SUBSEQ VISIT: HCPCS | Performed by: INTERNAL MEDICINE

## 2023-07-20 PROCEDURE — 3017F COLORECTAL CA SCREEN DOC REV: CPT | Performed by: INTERNAL MEDICINE

## 2023-07-20 PROCEDURE — 3079F DIAST BP 80-89 MM HG: CPT | Performed by: INTERNAL MEDICINE

## 2023-07-20 PROCEDURE — 1123F ACP DISCUSS/DSCN MKR DOCD: CPT | Performed by: INTERNAL MEDICINE

## 2023-07-20 PROCEDURE — 3075F SYST BP GE 130 - 139MM HG: CPT | Performed by: INTERNAL MEDICINE

## 2023-07-20 ASSESSMENT — PATIENT HEALTH QUESTIONNAIRE - PHQ9
SUM OF ALL RESPONSES TO PHQ QUESTIONS 1-9: 0
2. FEELING DOWN, DEPRESSED OR HOPELESS: 0
1. LITTLE INTEREST OR PLEASURE IN DOING THINGS: 0
SUM OF ALL RESPONSES TO PHQ9 QUESTIONS 1 & 2: 0

## 2023-07-20 ASSESSMENT — LIFESTYLE VARIABLES
HOW MANY STANDARD DRINKS CONTAINING ALCOHOL DO YOU HAVE ON A TYPICAL DAY: 1 OR 2
HOW OFTEN DO YOU HAVE A DRINK CONTAINING ALCOHOL: 4 OR MORE TIMES A WEEK

## 2023-07-20 NOTE — PATIENT INSTRUCTIONS
Plan for Gio Escoto - 7/20/2023  Medicare offers a range of preventive health benefits. Some of the tests and screenings are paid in full while other may be subject to a deductible, co-insurance, and/or copay. Some of these benefits include a comprehensive review of your medical history including lifestyle, illnesses that may run in your family, and various assessments and screenings as appropriate. After reviewing your medical record and screening and assessments performed today your provider may have ordered immunizations, labs, imaging, and/or referrals for you. A list of these orders (if applicable) as well as your Preventive Care list are included within your After Visit Summary for your review. Other Preventive Recommendations:    A preventive eye exam performed by an eye specialist is recommended every 1-2 years to screen for glaucoma; cataracts, macular degeneration, and other eye disorders. A preventive dental visit is recommended every 6 months. Try to get at least 150 minutes of exercise per week or 10,000 steps per day on a pedometer . Order or download the FREE \"Exercise & Physical Activity: Your Everyday Guide\" from [employer]. Call 5-290.895.6010 or search [employer] online. You need 6126-9831 mg of calcium and 0940-0775 IU of vitamin D per day. It is possible to meet your calcium requirement with diet alone, but a vitamin D supplement is usually necessary to meet this goal.  When exposed to the sun, use a sunscreen that protects against both UVA and UVB radiation with an SPF of 30 or greater. Reapply every 2 to 3 hours or after sweating, drying off with a towel, or swimming. Always wear a seat belt when traveling in a car. Always wear a helmet when riding a bicycle or motorcycle.

## 2023-07-25 ENCOUNTER — TELEPHONE (OUTPATIENT)
Dept: INTERNAL MEDICINE CLINIC | Age: 73
End: 2023-07-25

## 2023-07-25 RX ORDER — AMLODIPINE BESYLATE 5 MG/1
5 TABLET ORAL DAILY
Qty: 30 TABLET | Refills: 1 | Status: SHIPPED | OUTPATIENT
Start: 2023-07-25

## 2023-07-25 NOTE — TELEPHONE ENCOUNTER
----- Message from Ángela Dooley MD sent at 7/24/2023 10:34 AM EDT -----  Kidney test is worse   Stop lisinopril/HCTZ   Start norvasc 5 mg daily # 30 1 rf   See me in 1 month

## 2023-07-31 ENCOUNTER — TELEPHONE (OUTPATIENT)
Dept: INTERNAL MEDICINE CLINIC | Age: 73
End: 2023-07-31

## 2023-08-01 ENCOUNTER — TELEPHONE (OUTPATIENT)
Dept: INTERNAL MEDICINE CLINIC | Age: 73
End: 2023-08-01

## 2023-08-14 ENCOUNTER — TELEPHONE (OUTPATIENT)
Dept: INTERNAL MEDICINE CLINIC | Age: 73
End: 2023-08-14

## 2023-08-14 RX ORDER — NIFEDIPINE 30 MG/1
30 TABLET, FILM COATED, EXTENDED RELEASE ORAL DAILY
Qty: 30 TABLET | Refills: 0 | Status: SHIPPED | OUTPATIENT
Start: 2023-08-14

## 2023-08-30 ENCOUNTER — OFFICE VISIT (OUTPATIENT)
Dept: INTERNAL MEDICINE CLINIC | Age: 73
End: 2023-08-30

## 2023-08-30 VITALS
DIASTOLIC BLOOD PRESSURE: 84 MMHG | WEIGHT: 211 LBS | HEART RATE: 68 BPM | SYSTOLIC BLOOD PRESSURE: 144 MMHG | BODY MASS INDEX: 26.24 KG/M2 | HEIGHT: 75 IN

## 2023-08-30 DIAGNOSIS — N18.31 STAGE 3A CHRONIC KIDNEY DISEASE (HCC): ICD-10-CM

## 2023-08-30 DIAGNOSIS — I10 ESSENTIAL HYPERTENSION: ICD-10-CM

## 2023-08-30 DIAGNOSIS — I10 ESSENTIAL HYPERTENSION: Primary | ICD-10-CM

## 2023-08-30 LAB
ANION GAP SERPL CALCULATED.3IONS-SCNC: 14 MMOL/L (ref 3–16)
BUN SERPL-MCNC: 19 MG/DL (ref 7–20)
CALCIUM SERPL-MCNC: 9.8 MG/DL (ref 8.3–10.6)
CHLORIDE SERPL-SCNC: 98 MMOL/L (ref 99–110)
CO2 SERPL-SCNC: 26 MMOL/L (ref 21–32)
CREAT SERPL-MCNC: 1.7 MG/DL (ref 0.8–1.3)
GFR SERPLBLD CREATININE-BSD FMLA CKD-EPI: 42 ML/MIN/{1.73_M2}
GLUCOSE SERPL-MCNC: 101 MG/DL (ref 70–99)
POTASSIUM SERPL-SCNC: 4.1 MMOL/L (ref 3.5–5.1)
SODIUM SERPL-SCNC: 138 MMOL/L (ref 136–145)

## 2023-08-30 PROCEDURE — G8417 CALC BMI ABV UP PARAM F/U: HCPCS | Performed by: INTERNAL MEDICINE

## 2023-08-30 PROCEDURE — 1123F ACP DISCUSS/DSCN MKR DOCD: CPT | Performed by: INTERNAL MEDICINE

## 2023-08-30 PROCEDURE — 99213 OFFICE O/P EST LOW 20 MIN: CPT | Performed by: INTERNAL MEDICINE

## 2023-08-30 PROCEDURE — 3079F DIAST BP 80-89 MM HG: CPT | Performed by: INTERNAL MEDICINE

## 2023-08-30 PROCEDURE — G8428 CUR MEDS NOT DOCUMENT: HCPCS | Performed by: INTERNAL MEDICINE

## 2023-08-30 PROCEDURE — 3077F SYST BP >= 140 MM HG: CPT | Performed by: INTERNAL MEDICINE

## 2023-08-30 PROCEDURE — 3017F COLORECTAL CA SCREEN DOC REV: CPT | Performed by: INTERNAL MEDICINE

## 2023-08-30 PROCEDURE — 4004F PT TOBACCO SCREEN RCVD TLK: CPT | Performed by: INTERNAL MEDICINE

## 2023-08-30 RX ORDER — LISINOPRIL AND HYDROCHLOROTHIAZIDE 25; 20 MG/1; MG/1
1 TABLET ORAL DAILY
Qty: 1 TABLET | Refills: 0
Start: 2023-08-30

## 2023-08-30 ASSESSMENT — ENCOUNTER SYMPTOMS
DIARRHEA: 0
VOMITING: 0
WHEEZING: 0
CHEST TIGHTNESS: 0
NAUSEA: 0
BLOOD IN STOOL: 0
ABDOMINAL PAIN: 0
SHORTNESS OF BREATH: 0
COUGH: 0

## 2023-08-30 NOTE — PROGRESS NOTES
Veronique Knight (:  1950) is a 75 y.o. male,Established patient, here for evaluation of the following chief complaint(s):  Hypertension         ASSESSMENT/PLAN:        Diagnosis Orders   1. Essential hypertension  Basic Metabolic Panel      2. Stage 3a chronic kidney disease (HCC)  Basic Metabolic Panel        Bp is mildly elevated   Readings are better at home  Continue L/H  Check BMP    Subjective   SUBJECTIVE/OBJECTIVE:  Hypertension  Pertinent negatives include no chest pain, palpitations or shortness of breath. Is here for follow up of HTN  Lisinopril was stopped due to elevated CRTN  He could not tolerate norvasc  Switched to adalat CC- but he switched himself back to lisinopril/HCTZ     Review of Systems   Constitutional: Negative. Negative for fatigue and fever. Respiratory:  Negative for cough, chest tightness, shortness of breath and wheezing. Cardiovascular:  Negative for chest pain and palpitations. Gastrointestinal:  Negative for abdominal pain, blood in stool, diarrhea, nausea and vomiting. Objective   Physical Exam  Constitutional:       Appearance: He is well-developed. HENT:      Head: Normocephalic and atraumatic. Eyes:      Pupils: Pupils are equal, round, and reactive to light. Neck:      Thyroid: No thyromegaly. Cardiovascular:      Rate and Rhythm: Normal rate and regular rhythm. Heart sounds: Normal heart sounds. No murmur heard. No friction rub. No gallop. Comments: No carotid bruit  Pulmonary:      Effort: Pulmonary effort is normal. No respiratory distress. Breath sounds: Normal breath sounds. No wheezing or rales. Chest:      Chest wall: No tenderness. Abdominal:      General: Bowel sounds are normal. There is no distension. Palpations: Abdomen is soft. There is no mass. Tenderness: There is no abdominal tenderness. There is no guarding or rebound. Musculoskeletal:      Cervical back: Normal range of motion and neck supple.

## 2023-10-04 ENCOUNTER — OFFICE VISIT (OUTPATIENT)
Dept: INTERNAL MEDICINE CLINIC | Age: 73
End: 2023-10-04

## 2023-10-04 VITALS
WEIGHT: 214 LBS | DIASTOLIC BLOOD PRESSURE: 80 MMHG | BODY MASS INDEX: 26.61 KG/M2 | HEIGHT: 75 IN | SYSTOLIC BLOOD PRESSURE: 142 MMHG | HEART RATE: 68 BPM

## 2023-10-04 DIAGNOSIS — G62.9 NEUROPATHY: ICD-10-CM

## 2023-10-04 DIAGNOSIS — I10 ESSENTIAL HYPERTENSION: Primary | ICD-10-CM

## 2023-10-04 DIAGNOSIS — Z23 NEED FOR INFLUENZA VACCINATION: ICD-10-CM

## 2023-10-04 DIAGNOSIS — I10 ESSENTIAL HYPERTENSION: ICD-10-CM

## 2023-10-04 LAB
ANION GAP SERPL CALCULATED.3IONS-SCNC: 11 MMOL/L (ref 3–16)
BUN SERPL-MCNC: 16 MG/DL (ref 7–20)
CALCIUM SERPL-MCNC: 9.4 MG/DL (ref 8.3–10.6)
CHLORIDE SERPL-SCNC: 100 MMOL/L (ref 99–110)
CO2 SERPL-SCNC: 29 MMOL/L (ref 21–32)
CREAT SERPL-MCNC: 1.7 MG/DL (ref 0.8–1.3)
GFR SERPLBLD CREATININE-BSD FMLA CKD-EPI: 42 ML/MIN/{1.73_M2}
GLUCOSE SERPL-MCNC: 99 MG/DL (ref 70–99)
POTASSIUM SERPL-SCNC: 3.9 MMOL/L (ref 3.5–5.1)
SODIUM SERPL-SCNC: 140 MMOL/L (ref 136–145)
VIT B12 SERPL-MCNC: 468 PG/ML (ref 211–911)

## 2023-10-04 PROCEDURE — G8417 CALC BMI ABV UP PARAM F/U: HCPCS | Performed by: INTERNAL MEDICINE

## 2023-10-04 PROCEDURE — 99213 OFFICE O/P EST LOW 20 MIN: CPT | Performed by: INTERNAL MEDICINE

## 2023-10-04 PROCEDURE — 3017F COLORECTAL CA SCREEN DOC REV: CPT | Performed by: INTERNAL MEDICINE

## 2023-10-04 PROCEDURE — G8427 DOCREV CUR MEDS BY ELIG CLIN: HCPCS | Performed by: INTERNAL MEDICINE

## 2023-10-04 PROCEDURE — G0008 ADMIN INFLUENZA VIRUS VAC: HCPCS | Performed by: INTERNAL MEDICINE

## 2023-10-04 PROCEDURE — 4004F PT TOBACCO SCREEN RCVD TLK: CPT | Performed by: INTERNAL MEDICINE

## 2023-10-04 PROCEDURE — 3079F DIAST BP 80-89 MM HG: CPT | Performed by: INTERNAL MEDICINE

## 2023-10-04 PROCEDURE — G8484 FLU IMMUNIZE NO ADMIN: HCPCS | Performed by: INTERNAL MEDICINE

## 2023-10-04 PROCEDURE — 3077F SYST BP >= 140 MM HG: CPT | Performed by: INTERNAL MEDICINE

## 2023-10-04 PROCEDURE — 90662 IIV NO PRSV INCREASED AG IM: CPT | Performed by: INTERNAL MEDICINE

## 2023-10-04 PROCEDURE — 1123F ACP DISCUSS/DSCN MKR DOCD: CPT | Performed by: INTERNAL MEDICINE

## 2023-10-04 ASSESSMENT — ENCOUNTER SYMPTOMS
WHEEZING: 0
ABDOMINAL PAIN: 0
NAUSEA: 0
COUGH: 0
DIARRHEA: 0
VOMITING: 0
CHEST TIGHTNESS: 0
BLOOD IN STOOL: 0
SHORTNESS OF BREATH: 0

## 2023-10-04 NOTE — PROGRESS NOTES
Luis Antonio Andino (:  1950) is a 75 y.o. male,Established patient, here for evaluation of the following chief complaint(s):  Follow-up         ASSESSMENT/PLAN:        Diagnosis Orders   1. Essential hypertension  Basic Metabolic Panel      2. Need for influenza vaccination  Influenza, FLUZONE HIGH-DOSE, (age 72 y+), IM, Preservative Free, 0.7 mL      3. Neuropathy  Vitamin B12        Blood pressure is stable. Continue L/H 20/25 mg daily     Neuropathy  Check B 12 levels       Subjective   SUBJECTIVE/OBJECTIVE:  HPI  Is here for follow up of hypertension  Compliant with meds     Review of Systems   Constitutional: Negative. Negative for fatigue and fever. Respiratory:  Negative for cough, chest tightness, shortness of breath and wheezing. Cardiovascular:  Negative for chest pain and palpitations. Gastrointestinal:  Negative for abdominal pain, blood in stool, diarrhea, nausea and vomiting. Objective   Physical Exam  Constitutional:       Appearance: He is well-developed. HENT:      Head: Normocephalic and atraumatic. Eyes:      Pupils: Pupils are equal, round, and reactive to light. Neck:      Thyroid: No thyromegaly. Cardiovascular:      Rate and Rhythm: Normal rate and regular rhythm. Heart sounds: Normal heart sounds. No murmur heard. No friction rub. No gallop. Comments: No carotid bruit  Pulmonary:      Effort: Pulmonary effort is normal. No respiratory distress. Breath sounds: Normal breath sounds. No wheezing or rales. Chest:      Chest wall: No tenderness. Abdominal:      General: Bowel sounds are normal. There is no distension. Palpations: Abdomen is soft. There is no mass. Tenderness: There is no abdominal tenderness. There is no guarding or rebound. Musculoskeletal:      Cervical back: Normal range of motion and neck supple. Neurological:      Mental Status: He is alert and oriented to person, place, and time.                   An electronic

## 2023-10-05 ENCOUNTER — TELEPHONE (OUTPATIENT)
Dept: INTERNAL MEDICINE CLINIC | Age: 73
End: 2023-10-05

## 2023-10-05 DIAGNOSIS — R20.0 RIGHT LEG NUMBNESS: Primary | ICD-10-CM

## 2023-10-05 NOTE — TELEPHONE ENCOUNTER
----- Message from Seven Shepard MD sent at 10/5/2023 11:22 AM EDT -----  We do not know if he has neuropathy    We can do an EMG test right leg to find out   Dx numbness right leg   ----- Message -----  From: Julio Tim  Sent: 10/5/2023  11:19 AM EDT  To: Seven Shepard MD    Spouse wanting to know what the next step is in regards to the neuropathy?

## 2023-10-27 RX ORDER — ROSUVASTATIN CALCIUM 5 MG/1
TABLET, COATED ORAL
Qty: 60 TABLET | Refills: 0 | Status: SHIPPED | OUTPATIENT
Start: 2023-10-27

## 2023-10-27 RX ORDER — LISINOPRIL AND HYDROCHLOROTHIAZIDE 25; 20 MG/1; MG/1
1 TABLET ORAL DAILY
Qty: 90 TABLET | Refills: 0 | Status: SHIPPED | OUTPATIENT
Start: 2023-10-27

## 2023-11-16 ENCOUNTER — OFFICE VISIT (OUTPATIENT)
Dept: INTERNAL MEDICINE CLINIC | Age: 73
End: 2023-11-16

## 2023-11-16 VITALS
WEIGHT: 218 LBS | BODY MASS INDEX: 27.1 KG/M2 | SYSTOLIC BLOOD PRESSURE: 138 MMHG | HEART RATE: 72 BPM | HEIGHT: 75 IN | DIASTOLIC BLOOD PRESSURE: 82 MMHG

## 2023-11-16 DIAGNOSIS — I25.10 CORONARY ARTERY CALCIFICATION: ICD-10-CM

## 2023-11-16 DIAGNOSIS — I25.84 CORONARY ARTERY CALCIFICATION: ICD-10-CM

## 2023-11-16 DIAGNOSIS — N18.31 STAGE 3A CHRONIC KIDNEY DISEASE (HCC): ICD-10-CM

## 2023-11-16 DIAGNOSIS — I10 ESSENTIAL HYPERTENSION: Primary | ICD-10-CM

## 2023-11-16 DIAGNOSIS — G62.9 PERIPHERAL POLYNEUROPATHY: ICD-10-CM

## 2023-11-16 PROCEDURE — G8484 FLU IMMUNIZE NO ADMIN: HCPCS | Performed by: INTERNAL MEDICINE

## 2023-11-16 PROCEDURE — 3079F DIAST BP 80-89 MM HG: CPT | Performed by: INTERNAL MEDICINE

## 2023-11-16 PROCEDURE — 3075F SYST BP GE 130 - 139MM HG: CPT | Performed by: INTERNAL MEDICINE

## 2023-11-16 PROCEDURE — 99214 OFFICE O/P EST MOD 30 MIN: CPT | Performed by: INTERNAL MEDICINE

## 2023-11-16 PROCEDURE — 4004F PT TOBACCO SCREEN RCVD TLK: CPT | Performed by: INTERNAL MEDICINE

## 2023-11-16 PROCEDURE — G8417 CALC BMI ABV UP PARAM F/U: HCPCS | Performed by: INTERNAL MEDICINE

## 2023-11-16 PROCEDURE — 3017F COLORECTAL CA SCREEN DOC REV: CPT | Performed by: INTERNAL MEDICINE

## 2023-11-16 PROCEDURE — G8427 DOCREV CUR MEDS BY ELIG CLIN: HCPCS | Performed by: INTERNAL MEDICINE

## 2023-11-16 PROCEDURE — 1123F ACP DISCUSS/DSCN MKR DOCD: CPT | Performed by: INTERNAL MEDICINE

## 2023-11-16 RX ORDER — GABAPENTIN 100 MG/1
100 CAPSULE ORAL 3 TIMES DAILY
Qty: 90 CAPSULE | Refills: 2 | Status: SHIPPED | OUTPATIENT
Start: 2023-11-16 | End: 2024-02-14

## 2023-11-16 ASSESSMENT — ENCOUNTER SYMPTOMS
CHEST TIGHTNESS: 0
BLOOD IN STOOL: 0
WHEEZING: 0
ABDOMINAL PAIN: 0
DIARRHEA: 0
SHORTNESS OF BREATH: 0
VOMITING: 0
NAUSEA: 0
COUGH: 0

## 2023-11-16 NOTE — PROGRESS NOTES
guarding or rebound. Musculoskeletal:      Cervical back: Normal range of motion and neck supple. Neurological:      Mental Status: He is alert and oriented to person, place, and time. An electronic signature was used to authenticate this note.     --Tangela Alcantar MD

## 2023-11-17 LAB
ANION GAP SERPL CALCULATED.3IONS-SCNC: 13 MMOL/L (ref 3–16)
BUN SERPL-MCNC: 21 MG/DL (ref 7–20)
CALCIUM SERPL-MCNC: 9.3 MG/DL (ref 8.3–10.6)
CHLORIDE SERPL-SCNC: 98 MMOL/L (ref 99–110)
CO2 SERPL-SCNC: 26 MMOL/L (ref 21–32)
CREAT SERPL-MCNC: 1.6 MG/DL (ref 0.8–1.3)
GFR SERPLBLD CREATININE-BSD FMLA CKD-EPI: 45 ML/MIN/{1.73_M2}
GLUCOSE SERPL-MCNC: 89 MG/DL (ref 70–99)
POTASSIUM SERPL-SCNC: 3.8 MMOL/L (ref 3.5–5.1)
SODIUM SERPL-SCNC: 137 MMOL/L (ref 136–145)

## 2023-11-20 RX ORDER — ROSUVASTATIN CALCIUM 5 MG/1
TABLET, COATED ORAL
Qty: 60 TABLET | Refills: 0 | Status: SHIPPED | OUTPATIENT
Start: 2023-11-20

## 2024-01-19 RX ORDER — ROSUVASTATIN CALCIUM 5 MG/1
TABLET, COATED ORAL
Qty: 60 TABLET | Refills: 0 | Status: SHIPPED | OUTPATIENT
Start: 2024-01-19

## 2024-01-19 SDOH — ECONOMIC STABILITY: TRANSPORTATION INSECURITY
IN THE PAST 12 MONTHS, HAS LACK OF TRANSPORTATION KEPT YOU FROM MEETINGS, WORK, OR FROM GETTING THINGS NEEDED FOR DAILY LIVING?: NO

## 2024-01-19 SDOH — ECONOMIC STABILITY: FOOD INSECURITY: WITHIN THE PAST 12 MONTHS, THE FOOD YOU BOUGHT JUST DIDN'T LAST AND YOU DIDN'T HAVE MONEY TO GET MORE.: NEVER TRUE

## 2024-01-19 SDOH — ECONOMIC STABILITY: FOOD INSECURITY: WITHIN THE PAST 12 MONTHS, YOU WORRIED THAT YOUR FOOD WOULD RUN OUT BEFORE YOU GOT MONEY TO BUY MORE.: NEVER TRUE

## 2024-01-19 SDOH — ECONOMIC STABILITY: HOUSING INSECURITY
IN THE LAST 12 MONTHS, WAS THERE A TIME WHEN YOU DID NOT HAVE A STEADY PLACE TO SLEEP OR SLEPT IN A SHELTER (INCLUDING NOW)?: NO

## 2024-01-19 SDOH — ECONOMIC STABILITY: INCOME INSECURITY: HOW HARD IS IT FOR YOU TO PAY FOR THE VERY BASICS LIKE FOOD, HOUSING, MEDICAL CARE, AND HEATING?: NOT HARD AT ALL

## 2024-01-22 ENCOUNTER — OFFICE VISIT (OUTPATIENT)
Dept: INTERNAL MEDICINE CLINIC | Age: 74
End: 2024-01-22

## 2024-01-22 VITALS
BODY MASS INDEX: 27.98 KG/M2 | HEIGHT: 75 IN | HEART RATE: 68 BPM | WEIGHT: 225 LBS | SYSTOLIC BLOOD PRESSURE: 130 MMHG | DIASTOLIC BLOOD PRESSURE: 82 MMHG

## 2024-01-22 DIAGNOSIS — I10 ESSENTIAL HYPERTENSION: Primary | ICD-10-CM

## 2024-01-22 DIAGNOSIS — N18.31 STAGE 3A CHRONIC KIDNEY DISEASE (HCC): ICD-10-CM

## 2024-01-22 DIAGNOSIS — I25.84 CORONARY ARTERY CALCIFICATION: ICD-10-CM

## 2024-01-22 DIAGNOSIS — Z87.891 PERSONAL HISTORY OF TOBACCO USE: ICD-10-CM

## 2024-01-22 DIAGNOSIS — I25.10 CORONARY ARTERY CALCIFICATION: ICD-10-CM

## 2024-01-22 DIAGNOSIS — I10 ESSENTIAL HYPERTENSION: ICD-10-CM

## 2024-01-22 DIAGNOSIS — I71.40 ABDOMINAL AORTIC ANEURYSM (AAA) WITHOUT RUPTURE, UNSPECIFIED PART (HCC): ICD-10-CM

## 2024-01-22 PROCEDURE — G8417 CALC BMI ABV UP PARAM F/U: HCPCS | Performed by: INTERNAL MEDICINE

## 2024-01-22 PROCEDURE — 4004F PT TOBACCO SCREEN RCVD TLK: CPT | Performed by: INTERNAL MEDICINE

## 2024-01-22 PROCEDURE — 3075F SYST BP GE 130 - 139MM HG: CPT | Performed by: INTERNAL MEDICINE

## 2024-01-22 PROCEDURE — 3079F DIAST BP 80-89 MM HG: CPT | Performed by: INTERNAL MEDICINE

## 2024-01-22 PROCEDURE — 1123F ACP DISCUSS/DSCN MKR DOCD: CPT | Performed by: INTERNAL MEDICINE

## 2024-01-22 PROCEDURE — G0296 VISIT TO DETERM LDCT ELIG: HCPCS | Performed by: INTERNAL MEDICINE

## 2024-01-22 PROCEDURE — G8427 DOCREV CUR MEDS BY ELIG CLIN: HCPCS | Performed by: INTERNAL MEDICINE

## 2024-01-22 PROCEDURE — G8484 FLU IMMUNIZE NO ADMIN: HCPCS | Performed by: INTERNAL MEDICINE

## 2024-01-22 PROCEDURE — 3017F COLORECTAL CA SCREEN DOC REV: CPT | Performed by: INTERNAL MEDICINE

## 2024-01-22 PROCEDURE — 99214 OFFICE O/P EST MOD 30 MIN: CPT | Performed by: INTERNAL MEDICINE

## 2024-01-22 RX ORDER — SILDENAFIL CITRATE 20 MG/1
TABLET ORAL
Qty: 20 TABLET | Refills: 5 | Status: SHIPPED | OUTPATIENT
Start: 2024-01-22

## 2024-01-22 RX ORDER — ROSUVASTATIN CALCIUM 10 MG/1
10 TABLET, COATED ORAL NIGHTLY
Qty: 90 TABLET | Refills: 1 | Status: SHIPPED | OUTPATIENT
Start: 2024-01-22

## 2024-01-22 RX ORDER — LISINOPRIL AND HYDROCHLOROTHIAZIDE 25; 20 MG/1; MG/1
1 TABLET ORAL DAILY
Qty: 90 TABLET | Refills: 0 | Status: SHIPPED | OUTPATIENT
Start: 2024-01-22

## 2024-01-22 ASSESSMENT — PATIENT HEALTH QUESTIONNAIRE - PHQ9
SUM OF ALL RESPONSES TO PHQ QUESTIONS 1-9: 0
SUM OF ALL RESPONSES TO PHQ9 QUESTIONS 1 & 2: 0
SUM OF ALL RESPONSES TO PHQ QUESTIONS 1-9: 0
SUM OF ALL RESPONSES TO PHQ QUESTIONS 1-9: 0
2. FEELING DOWN, DEPRESSED OR HOPELESS: 0
1. LITTLE INTEREST OR PLEASURE IN DOING THINGS: 0
SUM OF ALL RESPONSES TO PHQ QUESTIONS 1-9: 0

## 2024-01-22 ASSESSMENT — ENCOUNTER SYMPTOMS
NAUSEA: 0
ABDOMINAL PAIN: 0
VOMITING: 0
COUGH: 0
WHEEZING: 0
DIARRHEA: 0
BLOOD IN STOOL: 0
CHEST TIGHTNESS: 0

## 2024-01-22 NOTE — PROGRESS NOTES
Bahman Meyer (:  1950) is a 73 y.o. male,Established patient, here for evaluation of the following chief complaint(s):  Follow-up         ASSESSMENT/PLAN:  1. Essential hypertension  2. Abdominal aortic aneurysm (AAA) without rupture, unspecified part (HCC)  3. Stage 3a chronic kidney disease (HCC)  4. Coronary artery calcification         Hypertension  Blood pressure is stable.  Continue L/H 20/25 mg daily      Stage 3 b CKD   Avoid NSAIDS    Cor artery calcification  Baby Asa  Crestor      Small AAA   Needs repeat CT in 3 years.     Peripheral neuropathy  Gabapentin - takes nightly     Counselled to stop smoking.      Subjective   SUBJECTIVE/OBJECTIVE:  HPI  Is here for follow up of hypertension  Compliant with meds   Has stage 3 b CKD     Has cor artery calcification.  Continues to smoke      Has peripheral neuropathy.    Review of Systems   Constitutional: Negative.  Negative for fatigue and fever.   Respiratory:  Negative for cough, chest tightness, shortness of breath and wheezing.    Cardiovascular:  Negative for chest pain and palpitations.   Gastrointestinal:  Negative for abdominal pain, blood in stool, diarrhea, nausea and vomiting.   Genitourinary:  Negative for hematuria.   Neurological:  Negative for light-headedness and headaches.          Objective   Physical Exam  Constitutional:       Appearance: He is well-developed.   HENT:      Head: Normocephalic and atraumatic.   Eyes:      Pupils: Pupils are equal, round, and reactive to light.   Neck:      Thyroid: No thyromegaly.   Cardiovascular:      Rate and Rhythm: Normal rate and regular rhythm.      Heart sounds: Normal heart sounds. No murmur heard.     No friction rub. No gallop.      Comments: No carotid bruit  Pulmonary:      Effort: Pulmonary effort is normal. No respiratory distress.      Breath sounds: Normal breath sounds. No wheezing or rales.   Chest:      Chest wall: No tenderness.   Abdominal:      General: Bowel sounds are

## 2024-01-23 LAB
ANION GAP SERPL CALCULATED.3IONS-SCNC: 16 MMOL/L (ref 3–16)
BUN SERPL-MCNC: 22 MG/DL (ref 7–20)
CALCIUM SERPL-MCNC: 9.5 MG/DL (ref 8.3–10.6)
CHLORIDE SERPL-SCNC: 97 MMOL/L (ref 99–110)
CO2 SERPL-SCNC: 24 MMOL/L (ref 21–32)
CREAT SERPL-MCNC: 1.6 MG/DL (ref 0.8–1.3)
GFR SERPLBLD CREATININE-BSD FMLA CKD-EPI: 45 ML/MIN/{1.73_M2}
GLUCOSE SERPL-MCNC: 96 MG/DL (ref 70–99)
POTASSIUM SERPL-SCNC: 4 MMOL/L (ref 3.5–5.1)
SODIUM SERPL-SCNC: 137 MMOL/L (ref 136–145)

## 2024-02-20 RX ORDER — GABAPENTIN 100 MG/1
100 CAPSULE ORAL 3 TIMES DAILY
Qty: 90 CAPSULE | Refills: 0 | Status: SHIPPED | OUTPATIENT
Start: 2024-02-20 | End: 2024-03-21

## 2024-02-20 RX ORDER — ROSUVASTATIN CALCIUM 5 MG/1
TABLET, COATED ORAL
Qty: 60 TABLET | Refills: 0 | OUTPATIENT
Start: 2024-02-20

## 2024-03-11 ENCOUNTER — HOSPITAL ENCOUNTER (OUTPATIENT)
Age: 74
Discharge: HOME | End: 2024-03-11
Payer: MEDICARE

## 2024-03-11 ENCOUNTER — TELEPHONE (OUTPATIENT)
Dept: INTERNAL MEDICINE CLINIC | Age: 74
End: 2024-03-11

## 2024-03-11 DIAGNOSIS — R68.89 FLU-LIKE SYMPTOMS: ICD-10-CM

## 2024-03-11 DIAGNOSIS — R68.89 FLU-LIKE SYMPTOMS: Primary | ICD-10-CM

## 2024-03-11 LAB
FLUAV RNA UPPER RESP QL NAA+PROBE: NEGATIVE
FLUBV AG NPH QL: NEGATIVE

## 2024-03-11 PROCEDURE — 87804 INFLUENZA ASSAY W/OPTIC: CPT

## 2024-03-11 RX ORDER — AZITHROMYCIN 250 MG/1
TABLET, FILM COATED ORAL
Qty: 6 TABLET | Refills: 0 | Status: SHIPPED | OUTPATIENT
Start: 2024-03-11

## 2024-03-11 NOTE — TELEPHONE ENCOUNTER
----- Message from Bill Pineda MD sent at 3/11/2024  1:47 PM EDT -----  Flu test negative    [facility]-Oleksandr

## 2024-03-11 NOTE — TELEPHONE ENCOUNTER
----- Message from Bill Pineda MD sent at 3/11/2024 10:15 AM EDT -----  Contact: Aurelia [phone]  Flu testing   ----- Message -----  From: Lizeth Valdez  Sent: 3/11/2024  10:12 AM EDT  To: Bill Pineda MD    Caller pt wife, states pt has been sick since Friday with symptoms of coughing with yellow sputum, runny nose, sleeping a lot and body aches with a low grade fever 100.8. Pt has been taking coricidin HD otc. Caller is concerned pt has the flu. Caller asking if you can call in tamiflu if you think that would help, and asking if pt would need a xray? Pt has taken 2 Covid test with negative results. Please advise     [facility]

## 2024-03-13 DIAGNOSIS — R05.9 COUGH, UNSPECIFIED TYPE: Primary | ICD-10-CM

## 2024-03-13 NOTE — PROGRESS NOTES
Spouse called in stating pt is still experiencing fatigue/weakness, fever, and coughing up yellow mucus. Pt has already been tested for the flu and covid, both were negative, and pt was prescribed zpack. Spouse is concerned pt may have pneumonia.    Per Dr. Pineda- CXR. Spouse informed, order placed.

## 2024-03-14 ENCOUNTER — HOSPITAL ENCOUNTER (OUTPATIENT)
Age: 74
Discharge: HOME | End: 2024-03-14
Payer: MEDICARE

## 2024-03-14 ENCOUNTER — HOSPITAL ENCOUNTER (OUTPATIENT)
Dept: GENERAL RADIOLOGY | Age: 74
Discharge: HOME | End: 2024-03-14
Payer: MEDICARE

## 2024-03-14 DIAGNOSIS — R05.9 COUGH, UNSPECIFIED TYPE: ICD-10-CM

## 2024-03-14 PROCEDURE — 71046 X-RAY EXAM CHEST 2 VIEWS: CPT

## 2024-04-02 ENCOUNTER — HOSPITAL ENCOUNTER (OUTPATIENT)
Dept: CT IMAGING | Age: 74
Discharge: HOME | End: 2024-04-02
Attending: INTERNAL MEDICINE
Payer: MEDICARE

## 2024-04-02 ENCOUNTER — OFFICE VISIT (OUTPATIENT)
Dept: INTERNAL MEDICINE CLINIC | Age: 74
End: 2024-04-02

## 2024-04-02 VITALS
HEIGHT: 75 IN | SYSTOLIC BLOOD PRESSURE: 142 MMHG | WEIGHT: 221 LBS | DIASTOLIC BLOOD PRESSURE: 78 MMHG | HEART RATE: 64 BPM | BODY MASS INDEX: 27.48 KG/M2

## 2024-04-02 DIAGNOSIS — I10 ESSENTIAL HYPERTENSION: Primary | ICD-10-CM

## 2024-04-02 DIAGNOSIS — I25.10 CORONARY ARTERY CALCIFICATION: ICD-10-CM

## 2024-04-02 DIAGNOSIS — N18.31 STAGE 3A CHRONIC KIDNEY DISEASE (HCC): ICD-10-CM

## 2024-04-02 DIAGNOSIS — G62.9 PERIPHERAL POLYNEUROPATHY: ICD-10-CM

## 2024-04-02 DIAGNOSIS — Z87.891 PERSONAL HISTORY OF TOBACCO USE: ICD-10-CM

## 2024-04-02 DIAGNOSIS — I25.84 CORONARY ARTERY CALCIFICATION: ICD-10-CM

## 2024-04-02 PROCEDURE — 3077F SYST BP >= 140 MM HG: CPT | Performed by: INTERNAL MEDICINE

## 2024-04-02 PROCEDURE — 71271 CT THORAX LUNG CANCER SCR C-: CPT

## 2024-04-02 PROCEDURE — 3017F COLORECTAL CA SCREEN DOC REV: CPT | Performed by: INTERNAL MEDICINE

## 2024-04-02 PROCEDURE — 3078F DIAST BP <80 MM HG: CPT | Performed by: INTERNAL MEDICINE

## 2024-04-02 PROCEDURE — G8427 DOCREV CUR MEDS BY ELIG CLIN: HCPCS | Performed by: INTERNAL MEDICINE

## 2024-04-02 PROCEDURE — 4004F PT TOBACCO SCREEN RCVD TLK: CPT | Performed by: INTERNAL MEDICINE

## 2024-04-02 PROCEDURE — G8417 CALC BMI ABV UP PARAM F/U: HCPCS | Performed by: INTERNAL MEDICINE

## 2024-04-02 PROCEDURE — 1123F ACP DISCUSS/DSCN MKR DOCD: CPT | Performed by: INTERNAL MEDICINE

## 2024-04-02 PROCEDURE — 99214 OFFICE O/P EST MOD 30 MIN: CPT | Performed by: INTERNAL MEDICINE

## 2024-04-02 RX ORDER — LISINOPRIL AND HYDROCHLOROTHIAZIDE 25; 20 MG/1; MG/1
1 TABLET ORAL DAILY
Qty: 90 TABLET | Refills: 1 | Status: SHIPPED | OUTPATIENT
Start: 2024-04-02

## 2024-04-02 RX ORDER — GABAPENTIN 100 MG/1
100 CAPSULE ORAL 3 TIMES DAILY
Qty: 90 CAPSULE | Refills: 0 | Status: SHIPPED | OUTPATIENT
Start: 2024-04-02 | End: 2024-05-02

## 2024-04-02 RX ORDER — SILDENAFIL CITRATE 20 MG/1
TABLET ORAL
Qty: 20 TABLET | Refills: 5 | Status: SHIPPED | OUTPATIENT
Start: 2024-04-02

## 2024-04-02 RX ORDER — ROSUVASTATIN CALCIUM 10 MG/1
10 TABLET, COATED ORAL NIGHTLY
Qty: 90 TABLET | Refills: 1 | Status: SHIPPED | OUTPATIENT
Start: 2024-04-02

## 2024-04-02 ASSESSMENT — ENCOUNTER SYMPTOMS
BLOOD IN STOOL: 0
DIARRHEA: 0
VOMITING: 0
CHEST TIGHTNESS: 0
WHEEZING: 0
COUGH: 0
SHORTNESS OF BREATH: 0
ABDOMINAL PAIN: 0
NAUSEA: 0

## 2024-04-02 NOTE — PROGRESS NOTES
Bahman Meyer (:  1950) is a 73 y.o. male,Established patient, here for evaluation of the following chief complaint(s):  Follow-up         ASSESSMENT/PLAN:        Diagnosis Orders   1. Essential hypertension        2. Stage 3a chronic kidney disease (HCC)        3. Coronary artery calcification        4. Peripheral polyneuropathy            Hypertension  Stable.  Continue L/H 20/25 mg daily      Stage 3 b CKD   Avoid NSAIDS     Cor artery calcification  Baby Asa  Crestor      Small AAA   Needs repeat CT in 3 years.     Peripheral neuropathy  Gabapentin - takes tid      Counselled to stop smoking.    Subjective   SUBJECTIVE/OBJECTIVE:  HPI  Is here for follow up of hypertension  Compliant with meds   Has stage 3 b CKD     Has cor artery calcification.  Continues to smoke      Has peripheral neuropathy.gabapentin helps     Review of Systems   Constitutional: Negative.  Negative for fatigue and fever.   Respiratory:  Negative for cough, chest tightness, shortness of breath and wheezing.    Cardiovascular:  Negative for chest pain and palpitations.   Gastrointestinal:  Negative for abdominal pain, blood in stool, diarrhea, nausea and vomiting.          Objective   Physical Exam  Constitutional:       Appearance: He is well-developed.   HENT:      Head: Normocephalic and atraumatic.   Eyes:      Pupils: Pupils are equal, round, and reactive to light.   Neck:      Thyroid: No thyromegaly.   Cardiovascular:      Rate and Rhythm: Normal rate and regular rhythm.      Heart sounds: Normal heart sounds. No murmur heard.     No friction rub. No gallop.      Comments: No carotid bruit  Pulmonary:      Effort: Pulmonary effort is normal. No respiratory distress.      Breath sounds: Normal breath sounds. No wheezing or rales.   Chest:      Chest wall: No tenderness.   Abdominal:      General: Bowel sounds are normal. There is no distension.      Palpations: Abdomen is soft. There is no mass.      Tenderness: There is no

## 2024-06-03 RX ORDER — GABAPENTIN 100 MG/1
100 CAPSULE ORAL 3 TIMES DAILY
Qty: 90 CAPSULE | Refills: 0 | Status: SHIPPED | OUTPATIENT
Start: 2024-06-03 | End: 2024-07-03

## 2024-06-07 ENCOUNTER — TELEPHONE (OUTPATIENT)
Dept: CARDIOLOGY CLINIC | Age: 74
End: 2024-06-07

## 2024-06-07 DIAGNOSIS — I71.20 THORACIC AORTIC ANEURYSM WITHOUT RUPTURE, UNSPECIFIED PART (HCC): ICD-10-CM

## 2024-06-07 DIAGNOSIS — I71.40 ABDOMINAL AORTIC ANEURYSM (AAA) WITHOUT RUPTURE, UNSPECIFIED PART (HCC): Primary | ICD-10-CM

## 2024-06-07 NOTE — TELEPHONE ENCOUNTER
The orders for the CTA Chest Abdomen orders they  on 24. Are we able to place new orders? Pt has an upcoming yearly ov with yari on 7/10/24.

## 2024-07-02 ENCOUNTER — HOSPITAL ENCOUNTER (OUTPATIENT)
Dept: CT IMAGING | Age: 74
Discharge: HOME | End: 2024-07-02
Attending: INTERNAL MEDICINE
Payer: MEDICARE

## 2024-07-02 DIAGNOSIS — I71.40 ABDOMINAL AORTIC ANEURYSM (AAA) WITHOUT RUPTURE, UNSPECIFIED PART (HCC): ICD-10-CM

## 2024-07-02 DIAGNOSIS — I71.20 THORACIC AORTIC ANEURYSM WITHOUT RUPTURE, UNSPECIFIED PART (HCC): ICD-10-CM

## 2024-07-02 LAB
PERFORMED ON: ABNORMAL
POC CREATININE: 1.8 MG/DL (ref 0.8–1.3)
POC SAMPLE TYPE: ABNORMAL

## 2024-07-02 PROCEDURE — 6360000004 HC RX CONTRAST MEDICATION: Performed by: INTERNAL MEDICINE

## 2024-07-02 PROCEDURE — 74175 CTA ABDOMEN W/CONTRAST: CPT

## 2024-07-02 PROCEDURE — 82565 ASSAY OF CREATININE: CPT

## 2024-07-02 RX ORDER — GABAPENTIN 100 MG/1
100 CAPSULE ORAL 3 TIMES DAILY
Qty: 90 CAPSULE | Refills: 0 | Status: SHIPPED | OUTPATIENT
Start: 2024-07-06 | End: 2024-08-05

## 2024-07-02 RX ADMIN — IOPAMIDOL 75 ML: 755 INJECTION, SOLUTION INTRAVENOUS at 15:28

## 2024-07-05 ENCOUNTER — TELEPHONE (OUTPATIENT)
Dept: CARDIOLOGY CLINIC | Age: 74
End: 2024-07-05

## 2024-07-05 DIAGNOSIS — I71.40 ABDOMINAL AORTIC ANEURYSM (AAA), UNSPECIFIED PART, UNSPECIFIED WHETHER RUPTURED (HCC): Primary | ICD-10-CM

## 2024-07-05 NOTE — TELEPHONE ENCOUNTER
Pts wife Ellen returned call, Pt gave verbal permission to relay results to ellen, Relayed RJ message. Ellen v/u. Aortic US orders placed in epic. CS number provided

## 2024-07-05 NOTE — TELEPHONE ENCOUNTER
----- Message from Charles Wang MD sent at 7/3/2024  4:18 PM EDT -----  Thoracic aortic measures remain stable as compared with echo 3/2023. Abdominal aorta appears slightly larger than previous, 3.6 from 3.2 cm.  Rec aortic US for AAA surveillance 6 months to ensure no rapid expansion. Follow up as planned. Continue to focus on good BP control - goal < 130/80. If persistently above goal, needs to notify office and we can consider options to improve control.

## 2024-07-10 ENCOUNTER — OFFICE VISIT (OUTPATIENT)
Dept: CARDIOLOGY CLINIC | Age: 74
End: 2024-07-10
Payer: MEDICARE

## 2024-07-10 VITALS
DIASTOLIC BLOOD PRESSURE: 60 MMHG | SYSTOLIC BLOOD PRESSURE: 132 MMHG | HEIGHT: 75 IN | WEIGHT: 214.5 LBS | BODY MASS INDEX: 26.67 KG/M2 | HEART RATE: 59 BPM | OXYGEN SATURATION: 96 %

## 2024-07-10 DIAGNOSIS — I25.10 CORONARY ARTERY CALCIFICATION: ICD-10-CM

## 2024-07-10 DIAGNOSIS — Z72.0 TOBACCO USE: ICD-10-CM

## 2024-07-10 DIAGNOSIS — I25.84 CORONARY ARTERY CALCIFICATION: ICD-10-CM

## 2024-07-10 DIAGNOSIS — I10 ESSENTIAL HYPERTENSION: ICD-10-CM

## 2024-07-10 DIAGNOSIS — I71.40 ABDOMINAL AORTIC ANEURYSM (AAA) WITHOUT RUPTURE, UNSPECIFIED PART (HCC): Primary | ICD-10-CM

## 2024-07-10 PROCEDURE — 3075F SYST BP GE 130 - 139MM HG: CPT | Performed by: INTERNAL MEDICINE

## 2024-07-10 PROCEDURE — G8417 CALC BMI ABV UP PARAM F/U: HCPCS | Performed by: INTERNAL MEDICINE

## 2024-07-10 PROCEDURE — 93000 ELECTROCARDIOGRAM COMPLETE: CPT | Performed by: INTERNAL MEDICINE

## 2024-07-10 PROCEDURE — 99214 OFFICE O/P EST MOD 30 MIN: CPT | Performed by: INTERNAL MEDICINE

## 2024-07-10 PROCEDURE — 3017F COLORECTAL CA SCREEN DOC REV: CPT | Performed by: INTERNAL MEDICINE

## 2024-07-10 PROCEDURE — 4004F PT TOBACCO SCREEN RCVD TLK: CPT | Performed by: INTERNAL MEDICINE

## 2024-07-10 PROCEDURE — 3078F DIAST BP <80 MM HG: CPT | Performed by: INTERNAL MEDICINE

## 2024-07-10 PROCEDURE — G8427 DOCREV CUR MEDS BY ELIG CLIN: HCPCS | Performed by: INTERNAL MEDICINE

## 2024-07-10 PROCEDURE — 1123F ACP DISCUSS/DSCN MKR DOCD: CPT | Performed by: INTERNAL MEDICINE

## 2024-07-10 NOTE — PROGRESS NOTES
documentation has been prepared under my direction and personally reviewed by me in its entirety.  I confirm that the note above accurately reflects all work, treatment, procedures, and medical decision making performed by me.  I, Charles Wang MD, personally performed the services described in this documentation as scribed by Duyen Peterson RN in my presence, and it is both accurate and complete to the best of our ability.       I will address the patient's cardiac risk factors and adjusted pharmacologic treatment as needed. In addition, I have reinforced the need for patient directed risk factor modification.  All questions and concerns were addressed to the patient/family. Alternatives to my treatment were discussed.     Thank you for allowing us to participate in the care of Bahman Meyer. Please call me with any questions [phone].    Charles Wang MD, [facility]  Cardiovascular Disease  [facility] [place]  [phone] [place] Office  [phone] [place] Office  7/10/2024 10:45 AM

## 2024-07-10 NOTE — PATIENT INSTRUCTIONS
PLAN:  Continue conservative measures for dependent edema: Elevate your legs to heart level while at rest, low salt diet, compression stockings as tolerated.    screening for AAA  1/2025  To schedule outpatient testing, contact Central Scheduling by calling [address] [place] [address] ([phone]).  Continue with good blood pressure control   Highly recommend you stop smoking.  1-800-QUIT-NOW

## 2024-07-30 ENCOUNTER — OFFICE VISIT (OUTPATIENT)
Dept: INTERNAL MEDICINE CLINIC | Age: 74
End: 2024-07-30

## 2024-07-30 VITALS
SYSTOLIC BLOOD PRESSURE: 136 MMHG | HEIGHT: 75 IN | WEIGHT: 222 LBS | DIASTOLIC BLOOD PRESSURE: 88 MMHG | HEART RATE: 60 BPM | BODY MASS INDEX: 27.6 KG/M2

## 2024-07-30 DIAGNOSIS — I71.40 ABDOMINAL AORTIC ANEURYSM (AAA) WITHOUT RUPTURE, UNSPECIFIED PART (HCC): ICD-10-CM

## 2024-07-30 DIAGNOSIS — L72.3 SEBACEOUS CYST: ICD-10-CM

## 2024-07-30 DIAGNOSIS — Z00.00 ROUTINE GENERAL MEDICAL EXAMINATION AT A HEALTH CARE FACILITY: ICD-10-CM

## 2024-07-30 DIAGNOSIS — I25.10 CORONARY ARTERY CALCIFICATION: ICD-10-CM

## 2024-07-30 DIAGNOSIS — N18.31 STAGE 3A CHRONIC KIDNEY DISEASE (HCC): ICD-10-CM

## 2024-07-30 DIAGNOSIS — I10 ESSENTIAL HYPERTENSION: ICD-10-CM

## 2024-07-30 DIAGNOSIS — I25.84 CORONARY ARTERY CALCIFICATION: ICD-10-CM

## 2024-07-30 DIAGNOSIS — Z00.00 MEDICARE ANNUAL WELLNESS VISIT, SUBSEQUENT: Primary | ICD-10-CM

## 2024-07-30 LAB
ALBUMIN SERPL-MCNC: 4.8 G/DL (ref 3.4–5)
ALBUMIN/GLOB SERPL: 2.2 {RATIO} (ref 1.1–2.2)
ALP SERPL-CCNC: 53 U/L (ref 40–129)
ALT SERPL-CCNC: 21 U/L (ref 10–40)
ANION GAP SERPL CALCULATED.3IONS-SCNC: 12 MMOL/L (ref 3–16)
AST SERPL-CCNC: 23 U/L (ref 15–37)
BASOPHILS # BLD: 0 K/UL (ref 0–0.2)
BASOPHILS NFR BLD: 0.6 %
BILIRUB SERPL-MCNC: 0.4 MG/DL (ref 0–1)
BUN SERPL-MCNC: 17 MG/DL (ref 7–20)
CALCIUM SERPL-MCNC: 9.2 MG/DL (ref 8.3–10.6)
CHOLEST SERPL-MCNC: 128 MG/DL (ref 0–199)
CO2 SERPL-SCNC: 27 MMOL/L (ref 21–32)
CREAT SERPL-MCNC: 1.7 MG/DL (ref 0.8–1.3)
DEPRECATED RDW RBC AUTO: 14.4 % (ref 12.4–15.4)
EOSINOPHIL # BLD: 0.2 K/UL (ref 0–0.6)
EOSINOPHIL NFR BLD: 5 %
GFR SERPLBLD CREATININE-BSD FMLA CKD-EPI: 42 ML/MIN/{1.73_M2}
GLUCOSE SERPL-MCNC: 101 MG/DL (ref 70–99)
HCT VFR BLD AUTO: 36.3 % (ref 40.5–52.5)
HDLC SERPL-MCNC: 54 MG/DL (ref 40–60)
HGB BLD-MCNC: 12.6 G/DL (ref 13.5–17.5)
LDLC SERPL CALC-MCNC: 63 MG/DL
LYMPHOCYTES # BLD: 1.6 K/UL (ref 1–5.1)
LYMPHOCYTES NFR BLD: 42.7 %
MCH RBC QN AUTO: 34 PG (ref 26–34)
MCHC RBC AUTO-ENTMCNC: 34.7 G/DL (ref 31–36)
MCV RBC AUTO: 97.8 FL (ref 80–100)
MONOCYTES # BLD: 0.3 K/UL (ref 0–1.3)
MONOCYTES NFR BLD: 7.9 %
NEUTROPHILS # BLD: 1.6 K/UL (ref 1.7–7.7)
NEUTROPHILS NFR BLD: 43.8 %
PLATELET # BLD AUTO: 141 K/UL (ref 135–450)
PMV BLD AUTO: 9.2 FL (ref 5–10.5)
POTASSIUM SERPL-SCNC: 4.3 MMOL/L (ref 3.5–5.1)
PROT SERPL-MCNC: 7 G/DL (ref 6.4–8.2)
RBC # BLD AUTO: 3.71 M/UL (ref 4.2–5.9)
SODIUM SERPL-SCNC: 138 MMOL/L (ref 136–145)
TRIGL SERPL-MCNC: 54 MG/DL (ref 0–150)
VLDLC SERPL CALC-MCNC: 11 MG/DL
WBC # BLD AUTO: 3.7 K/UL (ref 4–11)

## 2024-07-30 PROCEDURE — 3079F DIAST BP 80-89 MM HG: CPT | Performed by: INTERNAL MEDICINE

## 2024-07-30 PROCEDURE — G0439 PPPS, SUBSEQ VISIT: HCPCS | Performed by: INTERNAL MEDICINE

## 2024-07-30 PROCEDURE — 3075F SYST BP GE 130 - 139MM HG: CPT | Performed by: INTERNAL MEDICINE

## 2024-07-30 PROCEDURE — 3017F COLORECTAL CA SCREEN DOC REV: CPT | Performed by: INTERNAL MEDICINE

## 2024-07-30 PROCEDURE — 1123F ACP DISCUSS/DSCN MKR DOCD: CPT | Performed by: INTERNAL MEDICINE

## 2024-07-30 ASSESSMENT — PATIENT HEALTH QUESTIONNAIRE - PHQ9
2. FEELING DOWN, DEPRESSED OR HOPELESS: NOT AT ALL
SUM OF ALL RESPONSES TO PHQ QUESTIONS 1-9: 0
SUM OF ALL RESPONSES TO PHQ9 QUESTIONS 1 & 2: 0
1. LITTLE INTEREST OR PLEASURE IN DOING THINGS: NOT AT ALL
SUM OF ALL RESPONSES TO PHQ QUESTIONS 1-9: 0

## 2024-07-30 ASSESSMENT — LIFESTYLE VARIABLES
HOW OFTEN DO YOU HAVE A DRINK CONTAINING ALCOHOL: 4 OR MORE TIMES A WEEK
HOW MANY STANDARD DRINKS CONTAINING ALCOHOL DO YOU HAVE ON A TYPICAL DAY: 1 OR 2

## 2024-07-30 NOTE — PROGRESS NOTES
Medicare Annual Wellness Visit    Bahman Meyer is here for Medicare AWV    Assessment & Plan   Routine general medical examination at a health care facility  Essential hypertension  -     CBC with Auto Differential; Future  -     Comprehensive Metabolic Panel; Future  Coronary artery calcification  -     Lipid Panel; Future  Abdominal aortic aneurysm (AAA) without rupture, unspecified part (HCC)  Stage 3a chronic kidney disease (HCC)    Recommendations for Preventive Services Due: see orders and patient instructions/AVS.  Recommended screening schedule for the next 5-10 years is provided to the patient in written form: see Patient Instructions/AVS.     No follow-ups on file.     Subjective   The following acute and/or chronic problems were also addressed today:   Diagnosis Orders   1. Routine general medical examination at a health care facility        2. Essential hypertension  CBC with Auto Differential    Comprehensive Metabolic Panel      3. Coronary artery calcification  Lipid Panel      4. Abdominal aortic aneurysm (AAA) without rupture, unspecified part (HCC)        5. Stage 3a chronic kidney disease (HCC)        6. Sebaceous cyst  Yovany Singh MD, General Surgery, [facility]        AWV     Hypertension  Stable.  Continue L/H 20/25 mg daily      Stage 3 b CKD   Avoid NSAIDS     Cor artery calcification  Baby Asa  Crestor      Small AAA  Repeat in 6 months      Peripheral neuropathy  Gabapentin - takes tid      Counselled to stop smoking.    Patient's complete Health Risk Assessment and screening values have been reviewed and are found in Flowsheets. The following problems were reviewed today and where indicated follow up appointments were made and/or referrals ordered.    Positive Risk Factor Screenings with Interventions:                Inactivity:  On average, how many days per week do you engage in moderate to strenuous exercise (like a brisk walk)?: 2 days (!) Abnormal  On average, how many

## 2024-07-31 ENCOUNTER — TELEPHONE (OUTPATIENT)
Dept: INTERNAL MEDICINE CLINIC | Age: 74
End: 2024-07-31

## 2024-07-31 DIAGNOSIS — D64.9 ANEMIA, UNSPECIFIED TYPE: Primary | ICD-10-CM

## 2024-07-31 NOTE — TELEPHONE ENCOUNTER
----- Message from Bill Pineda MD sent at 7/31/2024  8:20 AM EDT -----  Kidney number is elevated but stable    Also the white blood count and the red blood count are  low.    Repeat CBC with differential in 1 week  Also B12 ,folate   Dx anemia

## 2024-08-05 RX ORDER — GABAPENTIN 100 MG/1
100 CAPSULE ORAL 3 TIMES DAILY
Qty: 90 CAPSULE | Refills: 0 | Status: SHIPPED | OUTPATIENT
Start: 2024-08-05 | End: 2024-09-04

## 2024-08-06 ENCOUNTER — INITIAL CONSULT (OUTPATIENT)
Dept: SURGERY | Age: 74
End: 2024-08-06
Payer: MEDICARE

## 2024-08-06 VITALS
WEIGHT: 220 LBS | HEIGHT: 75 IN | DIASTOLIC BLOOD PRESSURE: 84 MMHG | SYSTOLIC BLOOD PRESSURE: 150 MMHG | BODY MASS INDEX: 27.35 KG/M2

## 2024-08-06 DIAGNOSIS — D64.9 ANEMIA, UNSPECIFIED TYPE: ICD-10-CM

## 2024-08-06 DIAGNOSIS — L72.3 SEBACEOUS CYST: Primary | ICD-10-CM

## 2024-08-06 LAB
BASOPHILS # BLD: 0 K/UL (ref 0–0.2)
BASOPHILS NFR BLD: 0.6 %
DEPRECATED RDW RBC AUTO: 14.2 % (ref 12.4–15.4)
EOSINOPHIL # BLD: 0.3 K/UL (ref 0–0.6)
EOSINOPHIL NFR BLD: 6.8 %
FOLATE SERPL-MCNC: 12.47 NG/ML (ref 4.78–24.2)
HCT VFR BLD AUTO: 35 % (ref 40.5–52.5)
HGB BLD-MCNC: 12.3 G/DL (ref 13.5–17.5)
LYMPHOCYTES # BLD: 1.7 K/UL (ref 1–5.1)
LYMPHOCYTES NFR BLD: 39.8 %
MCH RBC QN AUTO: 34.1 PG (ref 26–34)
MCHC RBC AUTO-ENTMCNC: 35.2 G/DL (ref 31–36)
MCV RBC AUTO: 96.8 FL (ref 80–100)
MONOCYTES # BLD: 0.4 K/UL (ref 0–1.3)
MONOCYTES NFR BLD: 10.1 %
NEUTROPHILS # BLD: 1.8 K/UL (ref 1.7–7.7)
NEUTROPHILS NFR BLD: 42.7 %
PLATELET # BLD AUTO: 126 K/UL (ref 135–450)
PMV BLD AUTO: 9.3 FL (ref 5–10.5)
RBC # BLD AUTO: 3.62 M/UL (ref 4.2–5.9)
VIT B12 SERPL-MCNC: 359 PG/ML (ref 211–911)
WBC # BLD AUTO: 4.2 K/UL (ref 4–11)

## 2024-08-06 PROCEDURE — G8427 DOCREV CUR MEDS BY ELIG CLIN: HCPCS | Performed by: SURGERY

## 2024-08-06 PROCEDURE — 3077F SYST BP >= 140 MM HG: CPT | Performed by: SURGERY

## 2024-08-06 PROCEDURE — 4004F PT TOBACCO SCREEN RCVD TLK: CPT | Performed by: SURGERY

## 2024-08-06 PROCEDURE — 3079F DIAST BP 80-89 MM HG: CPT | Performed by: SURGERY

## 2024-08-06 PROCEDURE — 1123F ACP DISCUSS/DSCN MKR DOCD: CPT | Performed by: SURGERY

## 2024-08-06 PROCEDURE — G8417 CALC BMI ABV UP PARAM F/U: HCPCS | Performed by: SURGERY

## 2024-08-06 PROCEDURE — 3017F COLORECTAL CA SCREEN DOC REV: CPT | Performed by: SURGERY

## 2024-08-06 PROCEDURE — 99202 OFFICE O/P NEW SF 15 MIN: CPT | Performed by: SURGERY

## 2024-08-06 NOTE — PROGRESS NOTES
New Patient Consult    [facility]  [facility]  Darius Collado MD    [address], [address]  [place] [address]  [phone]    Bahman eMyer   YOB: 1950    Date of Visit:  8/6/2024    Bill Sanchez MD    Chief Complaint: Back cyst    HPI: Patient presents for evaluation of a cyst on his back.  He states he has had it for a few years.  It occasionally swells up and drains but does not really bother him.  It has never been infected.  It does not hurt    No Known Allergies  Outpatient Medications Marked as Taking for the 8/6/24 encounter (Initial consult) with Darius Collado MD   Medication Sig Dispense Refill    gabapentin (NEURONTIN) 100 MG capsule Take 1 capsule by mouth 3 times daily for 30 days. 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  Social History     Socioeconomic History    Marital status:      Spouse name: Not on file    Number of children: Not on file    Years of education: Not on file    Highest education level: Not on file   Occupational History    Not on file   Tobacco Use    Smoking status: Every Day     Current packs/day: 0.00

## 2024-09-16 RX ORDER — GABAPENTIN 100 MG/1
100 CAPSULE ORAL 3 TIMES DAILY
Qty: 90 CAPSULE | Refills: 0 | Status: SHIPPED | OUTPATIENT
Start: 2024-09-19 | End: 2024-10-19

## 2024-10-03 RX ORDER — LISINOPRIL AND HYDROCHLOROTHIAZIDE 20; 25 MG/1; MG/1
1 TABLET ORAL DAILY
Qty: 90 TABLET | Refills: 0 | Status: SHIPPED | OUTPATIENT
Start: 2024-10-03

## 2024-10-17 RX ORDER — GABAPENTIN 100 MG/1
100 CAPSULE ORAL 3 TIMES DAILY
Qty: 90 CAPSULE | Refills: 0 | Status: SHIPPED | OUTPATIENT
Start: 2024-10-17 | End: 2024-11-16

## 2024-10-21 RX ORDER — GABAPENTIN 100 MG/1
100 CAPSULE ORAL 3 TIMES DAILY
Qty: 90 CAPSULE | Refills: 0 | OUTPATIENT
Start: 2024-10-21

## 2024-11-14 RX ORDER — GABAPENTIN 100 MG/1
100 CAPSULE ORAL 3 TIMES DAILY
Qty: 90 CAPSULE | Refills: 0 | Status: SHIPPED | OUTPATIENT
Start: 2024-11-20 | End: 2024-12-20

## 2024-12-12 RX ORDER — GABAPENTIN 100 MG/1
100 CAPSULE ORAL 3 TIMES DAILY
Qty: 90 CAPSULE | Refills: 0 | Status: SHIPPED | OUTPATIENT
Start: 2024-12-17 | End: 2025-01-16

## 2024-12-16 RX ORDER — ROSUVASTATIN CALCIUM 10 MG/1
10 TABLET, COATED ORAL NIGHTLY
Qty: 90 TABLET | Refills: 0 | Status: SHIPPED | OUTPATIENT
Start: 2024-12-16

## 2025-01-07 RX ORDER — LISINOPRIL AND HYDROCHLOROTHIAZIDE 20; 25 MG/1; MG/1
1 TABLET ORAL DAILY
Qty: 90 TABLET | Refills: 0 | Status: SHIPPED | OUTPATIENT
Start: 2025-01-07

## 2025-01-10 RX ORDER — GABAPENTIN 100 MG/1
100 CAPSULE ORAL 3 TIMES DAILY
Qty: 90 CAPSULE | Refills: 0 | Status: SHIPPED | OUTPATIENT
Start: 2025-01-14 | End: 2025-02-13

## 2025-01-13 ENCOUNTER — TELEPHONE (OUTPATIENT)
Dept: SURGERY | Age: 75
End: 2025-01-13

## 2025-01-13 NOTE — TELEPHONE ENCOUNTER
Patient was seen 8/6/2024 for seb cyst on his back. Dr. Collado said to call when ready to schedule surgery.  He is ready to schedule  Call wife Aurelia   [phone]

## 2025-01-21 ENCOUNTER — HOSPITAL ENCOUNTER (OUTPATIENT)
Dept: ENDOSCOPY | Age: 75
Discharge: HOME | End: 2025-01-21
Payer: MEDICARE

## 2025-01-21 PROBLEM — L72.3 SEBACEOUS CYST: Status: ACTIVE | Noted: 2025-01-21

## 2025-01-21 PROCEDURE — 12032 INTMD RPR S/A/T/EXT 2.6-7.5: CPT | Performed by: SURGERY

## 2025-01-21 PROCEDURE — 11403 EXC TR-EXT B9+MARG 2.1-3CM: CPT | Performed by: SURGERY

## 2025-01-21 PROCEDURE — 11401 EXC TR-EXT B9+MARG 0.6-1 CM: CPT

## 2025-01-21 PROCEDURE — 88304 TISSUE EXAM BY PATHOLOGIST: CPT

## 2025-01-21 NOTE — PROGRESS NOTES
Pt here for excision of a back cyst.. Pt tolerated well, no complaints. Discharge instructions given, pt verbalized understanding. Discharged home in stable condition. Ambulatory to his car.

## 2025-01-21 NOTE — OP NOTE
[address] [address]                            OPERATIVE REPORT      PATIENT NAME: JARRETT ORDONEZ                  : 1950  MED REC NO: [id number]                      ROOM:   ACCOUNT NO: [id number]                       ADMIT DATE: 2025  PROVIDER: Darius Collado MD      DATE OF PROCEDURE:  2025    SURGEON:  Darius Collado MD    PREOPERATIVE DIAGNOSIS:  Sebaceous cyst to the back.    POSTOPERATIVE DIAGNOSIS:  Sebaceous cyst to the back.    PROCEDURE:  Excision of sebaceous cyst to the back.    ANESTHESIA:  Local.    ESTIMATED BLOOD LOSS:  Minimal.    INDICATIONS:  The patient is a 74-year-old gentleman with an enlarging uncomfortable sebaceous cyst of his back.  He is brought for excision.    OPERATIVE SUMMARY:  After preoperative evaluation, the patient was brought into the outpatient area and placed in a comfortable right lateral decubitus position on the stretcher.  His back was sterilely prepped and draped and anesthetized with local anesthetic.  The 2.2 cm cyst was excised via a 1.2 x 3 cm ellipse of skin.  Bleeding was controlled with pressure, and the incision was closed with interrupted subcutaneous sutures of 3-0 Vicryl and a running subcuticular suture of 4-0 Vicryl, followed by benzoin, Steri-Strips, and dry sterile dressings.  All sponge, needle, and instrument counts were correct at the end of the case.  The patient tolerated the procedure well and was discharged in good condition.          DARIUS COLLADO MD      D:  2025 12:40:02     T:  2025 13:00:55     MARY/PIETER  Job #:  [id number]     Doc#:  [id number]    CC:   Dr. Pineda

## 2025-01-21 NOTE — DISCHARGE INSTRUCTIONS
[facility]    Manny Avila M.D.   [facility] Office      [facility]        [facility]               [address]                [address]  Zeferino Cavazos M.D.              [address]           [address]          [place] [address]         [place] [address]  Darius Collado M.D                         [phone]        [facility]                   Yovany Valverde M.D.          [facility]       POST-OPERATIVE INSTRUCTIONS FOLLOWING EXCISION OF A MASS    Call the office to schedule your post-operative appointment with your surgeon for two (2) weeks if needed.     You will have white steri-strips and a dressing closing your incision.    You may remove the dressing tomorrow and shower.  Wash incisions gently, and pat them dry. Do not rub your incisions.    Watch for signs of infection:       Fever over 101.5°     Excessive warmth, or bright redness around your incision    Leakage of cloudy fluid from you incisions

## 2025-01-27 SDOH — ECONOMIC STABILITY: INCOME INSECURITY: IN THE LAST 12 MONTHS, WAS THERE A TIME WHEN YOU WERE NOT ABLE TO PAY THE MORTGAGE OR RENT ON TIME?: NO

## 2025-01-27 SDOH — ECONOMIC STABILITY: FOOD INSECURITY: WITHIN THE PAST 12 MONTHS, THE FOOD YOU BOUGHT JUST DIDN'T LAST AND YOU DIDN'T HAVE MONEY TO GET MORE.: NEVER TRUE

## 2025-01-27 SDOH — ECONOMIC STABILITY: TRANSPORTATION INSECURITY
IN THE PAST 12 MONTHS, HAS THE LACK OF TRANSPORTATION KEPT YOU FROM MEDICAL APPOINTMENTS OR FROM GETTING MEDICATIONS?: NO

## 2025-01-27 SDOH — ECONOMIC STABILITY: FOOD INSECURITY: WITHIN THE PAST 12 MONTHS, YOU WORRIED THAT YOUR FOOD WOULD RUN OUT BEFORE YOU GOT MONEY TO BUY MORE.: NEVER TRUE

## 2025-01-27 ASSESSMENT — PATIENT HEALTH QUESTIONNAIRE - PHQ9
1. LITTLE INTEREST OR PLEASURE IN DOING THINGS: NOT AT ALL
1. LITTLE INTEREST OR PLEASURE IN DOING THINGS: NOT AT ALL
SUM OF ALL RESPONSES TO PHQ9 QUESTIONS 1 & 2: 0
2. FEELING DOWN, DEPRESSED OR HOPELESS: NOT AT ALL
SUM OF ALL RESPONSES TO PHQ9 QUESTIONS 1 & 2: 0
SUM OF ALL RESPONSES TO PHQ QUESTIONS 1-9: 0
2. FEELING DOWN, DEPRESSED OR HOPELESS: NOT AT ALL

## 2025-01-30 ENCOUNTER — OFFICE VISIT (OUTPATIENT)
Dept: INTERNAL MEDICINE CLINIC | Age: 75
End: 2025-01-30

## 2025-01-30 VITALS
HEART RATE: 72 BPM | SYSTOLIC BLOOD PRESSURE: 134 MMHG | HEIGHT: 75 IN | DIASTOLIC BLOOD PRESSURE: 80 MMHG | WEIGHT: 222 LBS | BODY MASS INDEX: 27.6 KG/M2

## 2025-01-30 DIAGNOSIS — I25.10 CORONARY ARTERY CALCIFICATION: ICD-10-CM

## 2025-01-30 DIAGNOSIS — I10 ESSENTIAL HYPERTENSION: ICD-10-CM

## 2025-01-30 DIAGNOSIS — N18.31 STAGE 3A CHRONIC KIDNEY DISEASE (HCC): ICD-10-CM

## 2025-01-30 DIAGNOSIS — N52.9 ERECTILE DYSFUNCTION, UNSPECIFIED ERECTILE DYSFUNCTION TYPE: ICD-10-CM

## 2025-01-30 DIAGNOSIS — I10 ESSENTIAL HYPERTENSION: Primary | ICD-10-CM

## 2025-01-30 DIAGNOSIS — G62.9 PERIPHERAL POLYNEUROPATHY: ICD-10-CM

## 2025-01-30 LAB
ANION GAP SERPL CALCULATED.3IONS-SCNC: 10 MMOL/L (ref 3–16)
BUN SERPL-MCNC: 25 MG/DL (ref 7–20)
CALCIUM SERPL-MCNC: 9.8 MG/DL (ref 8.3–10.6)
CHLORIDE SERPL-SCNC: 99 MMOL/L (ref 99–110)
CO2 SERPL-SCNC: 29 MMOL/L (ref 21–32)
CREAT SERPL-MCNC: 1.9 MG/DL (ref 0.8–1.3)
GFR SERPLBLD CREATININE-BSD FMLA CKD-EPI: 36 ML/MIN/{1.73_M2}
GLUCOSE SERPL-MCNC: 108 MG/DL (ref 70–99)
POTASSIUM SERPL-SCNC: 3.9 MMOL/L (ref 3.5–5.1)
SODIUM SERPL-SCNC: 138 MMOL/L (ref 136–145)

## 2025-01-30 PROCEDURE — 3075F SYST BP GE 130 - 139MM HG: CPT | Performed by: INTERNAL MEDICINE

## 2025-01-30 PROCEDURE — G8427 DOCREV CUR MEDS BY ELIG CLIN: HCPCS | Performed by: INTERNAL MEDICINE

## 2025-01-30 PROCEDURE — 99214 OFFICE O/P EST MOD 30 MIN: CPT | Performed by: INTERNAL MEDICINE

## 2025-01-30 PROCEDURE — G8417 CALC BMI ABV UP PARAM F/U: HCPCS | Performed by: INTERNAL MEDICINE

## 2025-01-30 PROCEDURE — 3017F COLORECTAL CA SCREEN DOC REV: CPT | Performed by: INTERNAL MEDICINE

## 2025-01-30 PROCEDURE — 1036F TOBACCO NON-USER: CPT | Performed by: INTERNAL MEDICINE

## 2025-01-30 PROCEDURE — 1159F MED LIST DOCD IN RCRD: CPT | Performed by: INTERNAL MEDICINE

## 2025-01-30 PROCEDURE — 1160F RVW MEDS BY RX/DR IN RCRD: CPT | Performed by: INTERNAL MEDICINE

## 2025-01-30 PROCEDURE — 1123F ACP DISCUSS/DSCN MKR DOCD: CPT | Performed by: INTERNAL MEDICINE

## 2025-01-30 PROCEDURE — 3079F DIAST BP 80-89 MM HG: CPT | Performed by: INTERNAL MEDICINE

## 2025-01-30 RX ORDER — ROSUVASTATIN CALCIUM 10 MG/1
10 TABLET, COATED ORAL NIGHTLY
Qty: 90 TABLET | Refills: 1 | Status: SHIPPED | OUTPATIENT
Start: 2025-01-30

## 2025-01-30 RX ORDER — LISINOPRIL AND HYDROCHLOROTHIAZIDE 20; 25 MG/1; MG/1
1 TABLET ORAL DAILY
Qty: 90 TABLET | Refills: 1 | Status: SHIPPED | OUTPATIENT
Start: 2025-01-30

## 2025-01-30 RX ORDER — SILDENAFIL CITRATE 20 MG/1
TABLET ORAL
Qty: 20 TABLET | Refills: 5 | Status: SHIPPED | OUTPATIENT
Start: 2025-01-30

## 2025-01-30 ASSESSMENT — ENCOUNTER SYMPTOMS
WHEEZING: 0
ABDOMINAL PAIN: 0
VOMITING: 0
DIARRHEA: 0
SHORTNESS OF BREATH: 0
CHEST TIGHTNESS: 0
COUGH: 0
NAUSEA: 0
BLOOD IN STOOL: 0

## 2025-01-30 NOTE — PROGRESS NOTES
Abdominal:      General: Bowel sounds are normal. There is no distension.      Palpations: Abdomen is soft. There is no mass.      Tenderness: There is no abdominal tenderness. There is no guarding or rebound.   Musculoskeletal:      Cervical back: Normal range of motion and neck supple.   Neurological:      Mental Status: He is alert and oriented to person, place, and time.                An electronic signature was used to authenticate this note.    --CATHIE VAUGHN MD

## 2025-02-07 RX ORDER — GABAPENTIN 100 MG/1
100 CAPSULE ORAL 3 TIMES DAILY
Qty: 90 CAPSULE | Refills: 0 | Status: SHIPPED | OUTPATIENT
Start: 2025-02-13 | End: 2025-03-15

## 2025-03-17 RX ORDER — GABAPENTIN 100 MG/1
100 CAPSULE ORAL 3 TIMES DAILY
Qty: 90 CAPSULE | Refills: 0 | Status: SHIPPED | OUTPATIENT
Start: 2025-03-17 | End: 2025-04-16

## 2025-04-14 RX ORDER — GABAPENTIN 100 MG/1
100 CAPSULE ORAL 3 TIMES DAILY
Qty: 90 CAPSULE | Refills: 0 | Status: SHIPPED | OUTPATIENT
Start: 2025-04-19 | End: 2025-05-19

## 2025-04-24 ENCOUNTER — HOSPITAL ENCOUNTER (OUTPATIENT)
Dept: ULTRASOUND IMAGING | Age: 75
Discharge: HOME | End: 2025-04-24
Payer: MEDICARE

## 2025-04-24 DIAGNOSIS — I71.40 ABDOMINAL AORTIC ANEURYSM (AAA), UNSPECIFIED PART, UNSPECIFIED WHETHER RUPTURED: ICD-10-CM

## 2025-04-24 PROCEDURE — 76775 US EXAM ABDO BACK WALL LIM: CPT

## 2025-05-14 RX ORDER — GABAPENTIN 100 MG/1
100 CAPSULE ORAL 3 TIMES DAILY
Qty: 90 CAPSULE | Refills: 0 | Status: SHIPPED | OUTPATIENT
Start: 2025-05-19 | End: 2025-06-18

## 2025-06-19 RX ORDER — GABAPENTIN 100 MG/1
100 CAPSULE ORAL 3 TIMES DAILY
Qty: 90 CAPSULE | Refills: 0 | Status: SHIPPED | OUTPATIENT
Start: 2025-06-19 | End: 2025-07-19

## 2025-07-14 RX ORDER — GABAPENTIN 100 MG/1
100 CAPSULE ORAL 3 TIMES DAILY
Qty: 90 CAPSULE | Refills: 0 | Status: SHIPPED | OUTPATIENT
Start: 2025-07-19 | End: 2025-08-18

## 2025-07-29 SDOH — HEALTH STABILITY: PHYSICAL HEALTH: ON AVERAGE, HOW MANY MINUTES DO YOU ENGAGE IN EXERCISE AT THIS LEVEL?: 60 MIN

## 2025-07-29 SDOH — HEALTH STABILITY: PHYSICAL HEALTH: ON AVERAGE, HOW MANY DAYS PER WEEK DO YOU ENGAGE IN MODERATE TO STRENUOUS EXERCISE (LIKE A BRISK WALK)?: 2 DAYS

## 2025-07-29 ASSESSMENT — LIFESTYLE VARIABLES
HAS A RELATIVE, FRIEND, DOCTOR, OR ANOTHER HEALTH PROFESSIONAL EXPRESSED CONCERN ABOUT YOUR DRINKING OR SUGGESTED YOU CUT DOWN: NO
HOW OFTEN DO YOU HAVE A DRINK CONTAINING ALCOHOL: 4 OR MORE TIMES A WEEK
HOW OFTEN DO YOU HAVE A DRINK CONTAINING ALCOHOL: 5
HOW OFTEN DURING THE LAST YEAR HAVE YOU NEEDED AN ALCOHOLIC DRINK FIRST THING IN THE MORNING TO GET YOURSELF GOING AFTER A NIGHT OF HEAVY DRINKING: NEVER
HAVE YOU OR SOMEONE ELSE BEEN INJURED AS A RESULT OF YOUR DRINKING: NO
HOW OFTEN DURING THE LAST YEAR HAVE YOU HAD A FEELING OF GUILT OR REMORSE AFTER DRINKING: NEVER
HOW OFTEN DURING THE LAST YEAR HAVE YOU NEEDED AN ALCOHOLIC DRINK FIRST THING IN THE MORNING TO GET YOURSELF GOING AFTER A NIGHT OF HEAVY DRINKING: NEVER
HOW OFTEN DO YOU HAVE SIX OR MORE DRINKS ON ONE OCCASION: 2
HOW OFTEN DURING THE LAST YEAR HAVE YOU FOUND THAT YOU WERE NOT ABLE TO STOP DRINKING ONCE YOU HAD STARTED: NEVER
HOW OFTEN DURING THE LAST YEAR HAVE YOU BEEN UNABLE TO REMEMBER WHAT HAPPENED THE NIGHT BEFORE BECAUSE YOU HAD BEEN DRINKING: NEVER
HOW OFTEN DURING THE LAST YEAR HAVE YOU HAD A FEELING OF GUILT OR REMORSE AFTER DRINKING: NEVER
HOW OFTEN DURING THE LAST YEAR HAVE YOU FAILED TO DO WHAT WAS NORMALLY EXPECTED FROM YOU BECAUSE OF DRINKING: NEVER
HOW MANY STANDARD DRINKS CONTAINING ALCOHOL DO YOU HAVE ON A TYPICAL DAY: 1 OR 2
HAVE YOU OR SOMEONE ELSE BEEN INJURED AS A RESULT OF YOUR DRINKING: NO
HOW OFTEN DURING THE LAST YEAR HAVE YOU FOUND THAT YOU WERE NOT ABLE TO STOP DRINKING ONCE YOU HAD STARTED: NEVER
HOW MANY STANDARD DRINKS CONTAINING ALCOHOL DO YOU HAVE ON A TYPICAL DAY: 1
HAS A RELATIVE, FRIEND, DOCTOR, OR ANOTHER HEALTH PROFESSIONAL EXPRESSED CONCERN ABOUT YOUR DRINKING OR SUGGESTED YOU CUT DOWN: NO
HOW OFTEN DURING THE LAST YEAR HAVE YOU BEEN UNABLE TO REMEMBER WHAT HAPPENED THE NIGHT BEFORE BECAUSE YOU HAD BEEN DRINKING: NEVER
HOW OFTEN DURING THE LAST YEAR HAVE YOU FAILED TO DO WHAT WAS NORMALLY EXPECTED FROM YOU BECAUSE OF DRINKING: NEVER

## 2025-07-29 ASSESSMENT — PATIENT HEALTH QUESTIONNAIRE - PHQ9
SUM OF ALL RESPONSES TO PHQ QUESTIONS 1-9: 0
SUM OF ALL RESPONSES TO PHQ QUESTIONS 1-9: 0
1. LITTLE INTEREST OR PLEASURE IN DOING THINGS: NOT AT ALL
SUM OF ALL RESPONSES TO PHQ QUESTIONS 1-9: 0
SUM OF ALL RESPONSES TO PHQ QUESTIONS 1-9: 0
2. FEELING DOWN, DEPRESSED OR HOPELESS: NOT AT ALL

## 2025-08-01 ENCOUNTER — OFFICE VISIT (OUTPATIENT)
Dept: INTERNAL MEDICINE CLINIC | Age: 75
End: 2025-08-01

## 2025-08-01 VITALS
SYSTOLIC BLOOD PRESSURE: 116 MMHG | DIASTOLIC BLOOD PRESSURE: 78 MMHG | HEIGHT: 75 IN | WEIGHT: 219 LBS | BODY MASS INDEX: 27.23 KG/M2 | HEART RATE: 68 BPM

## 2025-08-01 DIAGNOSIS — N18.31 STAGE 3A CHRONIC KIDNEY DISEASE (HCC): ICD-10-CM

## 2025-08-01 DIAGNOSIS — Z00.00 ROUTINE GENERAL MEDICAL EXAMINATION AT A HEALTH CARE FACILITY: ICD-10-CM

## 2025-08-01 DIAGNOSIS — Z87.891 PERSONAL HISTORY OF TOBACCO USE: ICD-10-CM

## 2025-08-01 DIAGNOSIS — G62.9 PERIPHERAL POLYNEUROPATHY: ICD-10-CM

## 2025-08-01 DIAGNOSIS — Z13.1 SCREENING FOR DIABETES MELLITUS: ICD-10-CM

## 2025-08-01 DIAGNOSIS — I10 ESSENTIAL HYPERTENSION: ICD-10-CM

## 2025-08-01 DIAGNOSIS — I25.10 CORONARY ARTERY CALCIFICATION: ICD-10-CM

## 2025-08-01 DIAGNOSIS — Z00.00 MEDICARE ANNUAL WELLNESS VISIT, SUBSEQUENT: Primary | ICD-10-CM

## 2025-08-01 PROCEDURE — 3074F SYST BP LT 130 MM HG: CPT | Performed by: INTERNAL MEDICINE

## 2025-08-01 PROCEDURE — G0439 PPPS, SUBSEQ VISIT: HCPCS | Performed by: INTERNAL MEDICINE

## 2025-08-01 PROCEDURE — 3078F DIAST BP <80 MM HG: CPT | Performed by: INTERNAL MEDICINE

## 2025-08-01 PROCEDURE — 1160F RVW MEDS BY RX/DR IN RCRD: CPT | Performed by: INTERNAL MEDICINE

## 2025-08-01 PROCEDURE — G0296 VISIT TO DETERM LDCT ELIG: HCPCS | Performed by: INTERNAL MEDICINE

## 2025-08-01 PROCEDURE — 1123F ACP DISCUSS/DSCN MKR DOCD: CPT | Performed by: INTERNAL MEDICINE

## 2025-08-01 PROCEDURE — 1159F MED LIST DOCD IN RCRD: CPT | Performed by: INTERNAL MEDICINE

## 2025-08-01 RX ORDER — GABAPENTIN 100 MG/1
100 CAPSULE ORAL 3 TIMES DAILY
Qty: 270 CAPSULE | Refills: 1 | Status: SHIPPED | OUTPATIENT
Start: 2025-08-01 | End: 2025-10-30

## 2025-08-01 NOTE — PROGRESS NOTES
Medicare Annual Wellness Visit    Bahman Meyer is here for No chief complaint on file.    Assessment & Plan   Routine general medical examination at a health care facility  Essential hypertension  -     CBC with Auto Differential; Future  -     Comprehensive Metabolic Panel; Future  Coronary artery calcification  -     Lipid Panel; Future  Peripheral polyneuropathy  Stage 3a chronic kidney disease (HCC)  -     Comprehensive Metabolic Panel; Future  Screening for diabetes mellitus  -     Hemoglobin A1C; Future    AWV     Hypertension  Stable.  Continue L/H 20/25 mg daily      Stage 3 b CKD   Avoid NSAIDS     Cor artery calcification  Baby Asa  Crestor      Small AAA   Needs repeat CT in 2026      Peripheral neuropathy  Gabapentin - takes 100 mg tid             Subjective   The following acute and/or chronic problems were also addressed today:   Diagnosis Orders   1. Routine general medical examination at a health care facility        2. Essential hypertension  CBC with Auto Differential    Comprehensive Metabolic Panel      3. Coronary artery calcification  Lipid Panel      4. Peripheral polyneuropathy        5. Stage 3a chronic kidney disease (HCC)  Comprehensive Metabolic Panel      6. Screening for diabetes mellitus  Hemoglobin A1C          Patient's complete Health Risk Assessment and screening values have been reviewed and are found in Flowsheets. The following problems were reviewed today and where indicated follow up appointments were made and/or referrals ordered.    Positive Risk Factor Screenings with Interventions:       Alcohol Screening:  AUDIT-C Score: 5  AUDIT Total Score: 5  Total Score Interpretation: 0-7 suggests low risk alcohol consumption but assess individual risks     Interventions:  Alcohol consumption guidelines reviewed. Moderation recommended    Inactivity:  On average, how many days per week do you engage in moderate to strenuous exercise (like a brisk walk)?: 2 days (!) Abnormal  On

## 2025-08-02 LAB
ALBUMIN SERPL-MCNC: 4.9 G/DL (ref 3.4–5)
ALBUMIN/GLOB SERPL: 2 {RATIO} (ref 1.1–2.2)
ALP SERPL-CCNC: 48 U/L (ref 40–129)
ALT SERPL-CCNC: 24 U/L (ref 10–40)
ANION GAP SERPL CALCULATED.3IONS-SCNC: 12 MMOL/L (ref 3–16)
AST SERPL-CCNC: 24 U/L (ref 15–37)
BASOPHILS # BLD: 0 K/UL (ref 0–0.2)
BASOPHILS NFR BLD: 0.8 %
BILIRUB SERPL-MCNC: 0.5 MG/DL (ref 0–1)
BUN SERPL-MCNC: 28 MG/DL (ref 7–20)
CALCIUM SERPL-MCNC: 9.7 MG/DL (ref 8.3–10.6)
CHLORIDE SERPL-SCNC: 100 MMOL/L (ref 99–110)
CHOLEST SERPL-MCNC: 139 MG/DL (ref 0–199)
CO2 SERPL-SCNC: 27 MMOL/L (ref 21–32)
CREAT SERPL-MCNC: 2 MG/DL (ref 0.8–1.3)
DEPRECATED RDW RBC AUTO: 14.3 % (ref 12.4–15.4)
EOSINOPHIL # BLD: 0.2 K/UL (ref 0–0.6)
EOSINOPHIL NFR BLD: 4.9 %
EST. AVERAGE GLUCOSE BLD GHB EST-MCNC: 111.2 MG/DL
GFR SERPLBLD CREATININE-BSD FMLA CKD-EPI: 34 ML/MIN/{1.73_M2}
GLUCOSE SERPL-MCNC: 104 MG/DL (ref 70–99)
HBA1C MFR BLD: 5.5 %
HCT VFR BLD AUTO: 36.3 % (ref 40.5–52.5)
HDLC SERPL-MCNC: 47 MG/DL (ref 40–60)
HGB BLD-MCNC: 12.9 G/DL (ref 13.5–17.5)
LDLC SERPL CALC-MCNC: 63 MG/DL
LYMPHOCYTES # BLD: 1.8 K/UL (ref 1–5.1)
LYMPHOCYTES NFR BLD: 47.1 %
MCH RBC QN AUTO: 32.9 PG (ref 26–34)
MCHC RBC AUTO-ENTMCNC: 35.6 G/DL (ref 31–36)
MCV RBC AUTO: 92.5 FL (ref 80–100)
MONOCYTES # BLD: 0.4 K/UL (ref 0–1.3)
MONOCYTES NFR BLD: 9.7 %
NEUTROPHILS # BLD: 1.4 K/UL (ref 1.7–7.7)
NEUTROPHILS NFR BLD: 37.5 %
PLATELET # BLD AUTO: 136 K/UL (ref 135–450)
PMV BLD AUTO: 8.9 FL (ref 5–10.5)
POTASSIUM SERPL-SCNC: 4.7 MMOL/L (ref 3.5–5.1)
PROT SERPL-MCNC: 7.4 G/DL (ref 6.4–8.2)
RBC # BLD AUTO: 3.93 M/UL (ref 4.2–5.9)
SODIUM SERPL-SCNC: 139 MMOL/L (ref 136–145)
TRIGL SERPL-MCNC: 147 MG/DL (ref 0–150)
VLDLC SERPL CALC-MCNC: 29 MG/DL
WBC # BLD AUTO: 3.8 K/UL (ref 4–11)